# Patient Record
Sex: MALE | Race: WHITE | Employment: UNEMPLOYED | URBAN - METROPOLITAN AREA
[De-identification: names, ages, dates, MRNs, and addresses within clinical notes are randomized per-mention and may not be internally consistent; named-entity substitution may affect disease eponyms.]

---

## 2017-05-23 ENCOUNTER — EMPLOYEE WELLNESS (OUTPATIENT)
Dept: OTHER | Age: 57
End: 2017-05-23

## 2017-05-23 LAB
CHOLESTEROL, TOTAL: 163 MG/DL (ref 0–199)
GLUCOSE BLD-MCNC: 88 MG/DL (ref 70–99)
HDLC SERPL-MCNC: 53 MG/DL (ref 40–60)
LDL CHOLESTEROL CALCULATED: 86 MG/DL
TRIGL SERPL-MCNC: 119 MG/DL (ref 0–150)

## 2017-11-09 ENCOUNTER — HOSPITAL ENCOUNTER (OUTPATIENT)
Dept: ULTRASOUND IMAGING | Age: 57
Discharge: HOME | End: 2017-11-09
Attending: FAMILY MEDICINE | Admitting: FAMILY MEDICINE

## 2017-11-09 DIAGNOSIS — R74.02 NONSPECIFIC ELEVATION OF LEVELS OF TRANSAMINASE OR LACTIC ACID DEHYDROGENASE (LDH): ICD-10-CM

## 2017-11-09 DIAGNOSIS — R74.01 NONSPECIFIC ELEVATION OF LEVELS OF TRANSAMINASE OR LACTIC ACID DEHYDROGENASE (LDH): ICD-10-CM

## 2018-03-20 VITALS — WEIGHT: 283 LBS

## 2018-05-04 ENCOUNTER — EMPLOYEE WELLNESS (OUTPATIENT)
Dept: OTHER | Age: 58
End: 2018-05-04

## 2018-05-05 LAB
CHOLESTEROL, TOTAL: 184 MG/DL (ref 0–199)
GLUCOSE BLD-MCNC: 82 MG/DL (ref 70–99)
HDLC SERPL-MCNC: 49 MG/DL (ref 40–60)
LDL CHOLESTEROL CALCULATED: 122 MG/DL
TRIGL SERPL-MCNC: 65 MG/DL (ref 0–150)

## 2018-05-14 VITALS — WEIGHT: 283 LBS

## 2018-10-08 ENCOUNTER — HOSPITAL ENCOUNTER (OUTPATIENT)
Dept: ULTRASOUND IMAGING | Age: 58
Discharge: HOME | End: 2018-10-08
Payer: COMMERCIAL

## 2018-10-08 ENCOUNTER — APPOINTMENT (OUTPATIENT)
Dept: ULTRASOUND IMAGING | Age: 58
End: 2018-10-08
Payer: COMMERCIAL

## 2018-10-08 DIAGNOSIS — N17.9 ACUTE RENAL FAILURE, UNSPECIFIED ACUTE RENAL FAILURE TYPE (HCC): ICD-10-CM

## 2018-10-08 PROCEDURE — 76770 US EXAM ABDO BACK WALL COMP: CPT

## 2018-12-13 ENCOUNTER — HOSPITAL ENCOUNTER (OUTPATIENT)
Dept: GENERAL RADIOLOGY | Age: 58
Discharge: HOME | End: 2018-12-13
Payer: COMMERCIAL

## 2018-12-13 ENCOUNTER — HOSPITAL ENCOUNTER (OUTPATIENT)
Age: 58
Discharge: HOME | End: 2018-12-13
Payer: COMMERCIAL

## 2018-12-13 DIAGNOSIS — R06.2 WHEEZING: ICD-10-CM

## 2018-12-13 PROCEDURE — 71046 X-RAY EXAM CHEST 2 VIEWS: CPT

## 2019-04-04 ENCOUNTER — HOSPITAL ENCOUNTER (OUTPATIENT)
Dept: ULTRASOUND IMAGING | Age: 59
Discharge: HOME | End: 2019-04-04
Payer: COMMERCIAL

## 2019-04-04 ENCOUNTER — HOSPITAL ENCOUNTER (OUTPATIENT)
Age: 59
Discharge: HOME | End: 2019-04-04
Payer: COMMERCIAL

## 2019-04-04 DIAGNOSIS — N18.9 CHRONIC RENAL FAILURE, UNSPECIFIED CKD STAGE: ICD-10-CM

## 2019-04-04 LAB
ANION GAP SERPL CALCULATED.3IONS-SCNC: 10 MMOL/L (ref 3–16)
BASOPHILS ABSOLUTE: 0.1 K/UL (ref 0–0.2)
BASOPHILS RELATIVE PERCENT: 1.1 %
BUN BLDV-MCNC: 28 MG/DL (ref 7–20)
CALCIUM SERPL-MCNC: 9.5 MG/DL (ref 8.3–10.6)
CHLORIDE BLD-SCNC: 105 MMOL/L (ref 99–110)
CO2: 25 MMOL/L (ref 21–32)
CREAT SERPL-MCNC: 1.4 MG/DL (ref 0.9–1.3)
CREATININE URINE: 88.8 MG/DL (ref 39–259)
EOSINOPHILS ABSOLUTE: 0.4 K/UL (ref 0–0.6)
EOSINOPHILS RELATIVE PERCENT: 6.3 %
GFR AFRICAN AMERICAN: >60
GFR NON-AFRICAN AMERICAN: 52
GLUCOSE BLD-MCNC: 90 MG/DL (ref 70–99)
HCT VFR BLD CALC: 46.8 % (ref 40.5–52.5)
HEMOGLOBIN: 15.5 G/DL (ref 13.5–17.5)
LYMPHOCYTES ABSOLUTE: 1.3 K/UL (ref 1–5.1)
LYMPHOCYTES RELATIVE PERCENT: 21.5 %
MCH RBC QN AUTO: 29.5 PG (ref 26–34)
MCHC RBC AUTO-ENTMCNC: 33 G/DL (ref 31–36)
MCV RBC AUTO: 89.2 FL (ref 80–100)
MONOCYTES ABSOLUTE: 0.5 K/UL (ref 0–1.3)
MONOCYTES RELATIVE PERCENT: 7.9 %
NEUTROPHILS ABSOLUTE: 3.8 K/UL (ref 1.7–7.7)
NEUTROPHILS RELATIVE PERCENT: 63.2 %
PDW BLD-RTO: 14.7 % (ref 12.4–15.4)
PHOSPHORUS: 3 MG/DL (ref 2.5–4.9)
PLATELET # BLD: 175 K/UL (ref 135–450)
PMV BLD AUTO: 7.8 FL (ref 5–10.5)
POTASSIUM SERPL-SCNC: 4.5 MMOL/L (ref 3.5–5.1)
PROTEIN PROTEIN: 11 MG/DL
PROTEIN/CREAT RATIO: 0.1 MG/DL
RBC # BLD: 5.25 M/UL (ref 4.2–5.9)
SODIUM BLD-SCNC: 140 MMOL/L (ref 136–145)
WBC # BLD: 6 K/UL (ref 4–11)

## 2019-04-04 PROCEDURE — 36415 COLL VENOUS BLD VENIPUNCTURE: CPT

## 2019-04-04 PROCEDURE — 82570 ASSAY OF URINE CREATININE: CPT

## 2019-04-04 PROCEDURE — 84100 ASSAY OF PHOSPHORUS: CPT

## 2019-04-04 PROCEDURE — 84156 ASSAY OF PROTEIN URINE: CPT

## 2019-04-04 PROCEDURE — 80048 BASIC METABOLIC PNL TOTAL CA: CPT

## 2019-04-04 PROCEDURE — 85025 COMPLETE CBC W/AUTO DIFF WBC: CPT

## 2019-04-04 PROCEDURE — 76770 US EXAM ABDO BACK WALL COMP: CPT

## 2019-05-06 ENCOUNTER — EMPLOYEE WELLNESS (OUTPATIENT)
Dept: OTHER | Age: 59
End: 2019-05-06

## 2019-05-06 LAB
CHOLESTEROL, TOTAL: 188 MG/DL (ref 0–199)
GLUCOSE BLD-MCNC: 92 MG/DL (ref 70–99)
HDLC SERPL-MCNC: 43 MG/DL (ref 40–60)
LDL CHOLESTEROL CALCULATED: 127 MG/DL
TRIGL SERPL-MCNC: 89 MG/DL (ref 0–150)

## 2019-05-13 VITALS — WEIGHT: 284 LBS

## 2022-07-17 SDOH — HEALTH STABILITY: PHYSICAL HEALTH: ON AVERAGE, HOW MANY DAYS PER WEEK DO YOU ENGAGE IN MODERATE TO STRENUOUS EXERCISE (LIKE A BRISK WALK)?: 1 DAY

## 2022-07-17 SDOH — HEALTH STABILITY: PHYSICAL HEALTH: ON AVERAGE, HOW MANY MINUTES DO YOU ENGAGE IN EXERCISE AT THIS LEVEL?: 10 MIN

## 2022-07-17 ASSESSMENT — SOCIAL DETERMINANTS OF HEALTH (SDOH)
WITHIN THE LAST YEAR, HAVE YOU BEEN HUMILIATED OR EMOTIONALLY ABUSED IN OTHER WAYS BY YOUR PARTNER OR EX-PARTNER?: NO
WITHIN THE LAST YEAR, HAVE YOU BEEN AFRAID OF YOUR PARTNER OR EX-PARTNER?: NO
WITHIN THE LAST YEAR, HAVE TO BEEN RAPED OR FORCED TO HAVE ANY KIND OF SEXUAL ACTIVITY BY YOUR PARTNER OR EX-PARTNER?: NO
WITHIN THE LAST YEAR, HAVE YOU BEEN KICKED, HIT, SLAPPED, OR OTHERWISE PHYSICALLY HURT BY YOUR PARTNER OR EX-PARTNER?: NO

## 2022-07-20 ENCOUNTER — OFFICE VISIT (OUTPATIENT)
Dept: ORTHOPEDIC SURGERY | Age: 62
End: 2022-07-20
Payer: COMMERCIAL

## 2022-07-20 VITALS — HEIGHT: 70 IN | BODY MASS INDEX: 36.65 KG/M2 | WEIGHT: 256 LBS

## 2022-07-20 DIAGNOSIS — M51.36 DDD (DEGENERATIVE DISC DISEASE), LUMBAR: ICD-10-CM

## 2022-07-20 DIAGNOSIS — M79.604 BILATERAL LEG PAIN: ICD-10-CM

## 2022-07-20 DIAGNOSIS — M79.605 BILATERAL LEG PAIN: ICD-10-CM

## 2022-07-20 DIAGNOSIS — M54.50 LUMBAR SPINE PAIN: Primary | ICD-10-CM

## 2022-07-20 DIAGNOSIS — M48.062 SPINAL STENOSIS OF LUMBAR REGION WITH NEUROGENIC CLAUDICATION: ICD-10-CM

## 2022-07-20 PROCEDURE — 99203 OFFICE O/P NEW LOW 30 MIN: CPT | Performed by: FAMILY MEDICINE

## 2022-07-20 PROCEDURE — L0625 LO FLEX L1-BELOW L5 PRE OTS: HCPCS | Performed by: FAMILY MEDICINE

## 2022-07-20 RX ORDER — METHYLPREDNISOLONE 4 MG/1
TABLET ORAL
Qty: 21 KIT | Refills: 0 | Status: SHIPPED | OUTPATIENT
Start: 2022-07-20 | End: 2022-09-14

## 2022-07-20 RX ORDER — ROPINIROLE 0.5 MG/1
0.5 TABLET, FILM COATED ORAL 2 TIMES DAILY WITH MEALS
COMMUNITY

## 2022-07-20 RX ORDER — LISINOPRIL 5 MG/1
TABLET ORAL
COMMUNITY
Start: 2022-06-11

## 2022-07-20 RX ORDER — CELECOXIB 200 MG/1
CAPSULE ORAL
COMMUNITY
Start: 2022-07-06

## 2022-07-20 RX ORDER — ALLOPURINOL 300 MG/1
TABLET ORAL
COMMUNITY
Start: 2007-11-09

## 2022-07-20 RX ORDER — TOPIRAMATE 25 MG/1
25 TABLET ORAL 2 TIMES DAILY
COMMUNITY
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
## 2022-07-22 ENCOUNTER — TELEPHONE (OUTPATIENT)
Dept: ORTHOPEDIC SURGERY | Age: 62
End: 2022-07-22

## 2022-07-22 NOTE — TELEPHONE ENCOUNTER
Left voicemail for patient that their MRI has been authorized and that they can call and schedule scan at their convenience. Also told them that they can call and schedule a f/u with Hussein Dennison once they have MRI scheduled, leaving at least 2-3 days for our office to receive their results.

## 2022-07-29 ENCOUNTER — EVALUATION (OUTPATIENT)
Dept: PHYSICAL THERAPY | Age: 62
End: 2022-07-29
Payer: COMMERCIAL

## 2022-07-29 DIAGNOSIS — G89.29 CHRONIC LOW BACK PAIN WITH BILATERAL SCIATICA, UNSPECIFIED BACK PAIN LATERALITY: Primary | ICD-10-CM

## 2022-07-29 DIAGNOSIS — M54.41 CHRONIC LOW BACK PAIN WITH BILATERAL SCIATICA, UNSPECIFIED BACK PAIN LATERALITY: Primary | ICD-10-CM

## 2022-07-29 DIAGNOSIS — M54.42 CHRONIC LOW BACK PAIN WITH BILATERAL SCIATICA, UNSPECIFIED BACK PAIN LATERALITY: Primary | ICD-10-CM

## 2022-07-29 DIAGNOSIS — M79.604 BILATERAL LEG PAIN: ICD-10-CM

## 2022-07-29 DIAGNOSIS — M48.062 SPINAL STENOSIS OF LUMBAR REGION WITH NEUROGENIC CLAUDICATION: ICD-10-CM

## 2022-07-29 DIAGNOSIS — M79.605 BILATERAL LEG PAIN: ICD-10-CM

## 2022-07-29 DIAGNOSIS — M51.36 DDD (DEGENERATIVE DISC DISEASE), LUMBAR: ICD-10-CM

## 2022-07-29 PROCEDURE — 97112 NEUROMUSCULAR REEDUCATION: CPT | Performed by: PHYSICAL THERAPIST

## 2022-07-29 PROCEDURE — 97110 THERAPEUTIC EXERCISES: CPT | Performed by: PHYSICAL THERAPIST

## 2022-07-29 PROCEDURE — 97162 PT EVAL MOD COMPLEX 30 MIN: CPT | Performed by: PHYSICAL THERAPIST

## 2022-07-29 NOTE — PROGRESS NOTES
[facility]   Phone: [phone]    Fax: [phone]          Physical Therapy Treatment Note/ Progress Report:           Date:  2022    Patient Name:  Misti Jackson    :  1960  MRN: [id number]  Restrictions/Precautions:    Medical/Treatment Diagnosis Information:  Diagnosis: Lumbar stenosis w/ neurogenic claudication, Lumbar DDD, B leg pain     Insurance/Certification information:  PT Insurance Information: BCBS  Physician Information:  Referring Practitioner: Dr. Gilmar Schultz  Has the plan of care been signed (Y/N):        []  Yes  [x]  No     Date of Patient follow up with Physician: 8/15/2022 Sujatha Santiago PA-C      Is this a Progress Report:     [x]  Yes  []  No        If Yes:  Date Range for reporting period:  Beginnin2022  Endin2022    Progress report will be due (10 Rx or 30 days whichever is less):        Recertification will be due (POC Duration  / 90 days whichever is less): 2022         Visit # Insurance Allowable Auth Required   1 30 []  Yes [x]  No        Functional Scale: FOTO score: 38    Date assessed:  2022      Latex Allergy:  [x]NO      []YES  Preferred Language for Healthcare:   [x]English       []other:      Pain level:  7/10         SUBJECTIVE:  See eval        OBJECTIVE: See eval        ROM   Comments:  2022   Lumbar Flex 65%     Lumbar Ext neutral        ROM LEFT RIGHT Comments:  2022   Lumbar Side Bend 65% 65%     Lumbar Rotation 40% 40%     Hip Flexion         Hip Abd         Hip ER         Hip IR         Hip Extension         Knee Ext WNL WNL     Knee Flex WNL WNL     Hamstring Flex         Piriformis                                Strength LEFT RIGHT Comments:  2022   Multifidus         Transverse Ab         Hip Flexors 4/5* 4/5*     Hip Abductors 4-/5* 4-/5* In sitting   Hip Adductors 3+/5* 3+/5* In sitting   Knee extension 4-/5* 4-/5* In sitting   Knee flexion 3+/5* 3+/5* In sitting DF 4+/5* 4+/5        Myotomes Normal Abnormal Comments:  7/29/2022   [x]ALL NORMAL                   Hip flexion (L1-L2) []  []      Knee extension (L2-L4) []  []      Dorsiflexion (L4-L5) []  []      Great Toe Ext (L5) []  []      Ankle Eversion (S1-S2) []  []      Ankle PF(S1-S2) []  []         Dermatomes Normal Abnormal Comments:  7/29/2022   []ALL NORMAL                   inguinal area (L1) [x]  []      anterior mid-thigh (L2) []  [x]  Disrupted on R from fibromyalgia, not new   distal ant thigh/med knee (L3) [x]  []      medial lower leg and foot (L4) [x]  []      lateral lower leg and foot (L5) [x]  []      posterior calf (S1) [x]  []      medial calcaneus (S2) [x]  []            Reflexes Normal Abnormal Comments:  7/29/2022   [x]ALL NORMAL                   S1-2 Seated achilles []  []      S1-2 Prone knee bend []  []      L3-4 Patellar tendon []  []      C5-6 Biceps []  []      C6 Brachioradialis []  []      C7-8 Triceps []  []      Clonus []  []      Babinski []  []      Sharif's []  []         Neural dynamic tension testing Normal Abnormal Comments:  7/29/2022   Slump Test  - Degree of knee flexion:  []  [x]   Positive bilaterally with minimal flexion of spine or LE extension   SLR []  []      0-30 []  []      30-70 []  []      Femoral nerve (L2-4) []  []           Normal Abnormal N/A Comments:  7/29/2022   Fwd Bend-aberrant or innominate mvmt) []  []  []      Trendelenburg []  []  []      Kemps/Alexander []  []  []      Edmond Pratt []  []  []      SAQIB/Henry []  []  []      Hip scour []  []  []      Supine to sit []  []  []      Prone knee bend []  []  []                  Hip thrust []  []  []      SI distraction/compression []  []  []      Sacral Spring/thrust []  []  []                          RESTRICTIONS/PRECAUTIONS: chronic tachycardia, postconcussive syndrome, fibromyalgia        Exercises/Interventions:     Exercise/Equipment Resistance/Repetitions Other comments        Hamstring stretch 5x20\" EOT; bilaterally   Calf stretch 5x20\" bilaterally   Piriformis stretch     Knee to chest     Seated on heels low back stretch     Pelvic tilts     TrA contraction 10x10\"    TrA contraction with alt marches     Prone press ups     BS 10x10\"    B hip abd with TB          Table walk back 10x10\"         Low lumbar rotation     bridges     Cat/camel      bird/dog     Opposite UE to LE isometric core     TrA/mutlifidi walk outs     TrA/multifidi push outs                         SB pikes     SB knee tucks     SB roll outs     planks                         Bike     Treadmill             Therapeutic Exercise and NMR EXR  [x] (71449) Provided verbal/tactile cueing for activities related to strengthening, flexibility, endurance, ROM  for improvements in proximal hip and core control with self care, mobility, lifting and ambulation. [x] (55896) Provided verbal/tactile cueing for activities related to improving balance, coordination, kinesthetic sense, posture, motor skill, proprioception  to assist with core control in self care, mobility, lifting, and ambulation. Therapeutic Activities:    [] (88240 or 28741) Provided verbal/tactile cueing for activities related to improving balance, coordination, kinesthetic sense, posture, motor skill, proprioception and motor activation to allow for proper function  with self care and ADLs  [] (07883) Provided training and instruction to the patient for proper core and proximal hip recruitment and positioning with ambulation re-education     Home Exercise Program:    [x] (15258) Reviewed/Progressed HEP activities related to strengthening, flexibility, endurance, ROM of core, proximal hip and LE for functional self-care, mobility, lifting and ambulation     Access Code: [email or web address]  URL: [email or web address]  Date: 07/29/2022  Prepared by: Bassem Majano    Exercises  Long Sitting Calf Stretch with Strap - 1-2 x daily - 5-7 x weekly - 1 sets - 5 reps - 20 seconds hold  Seated Table Hamstring Stretch - 1-2 x daily - 5-7 x weekly - 1 sets - 5 reps - 20 seconds hold  Long Sitting Hip Adduction Isometric with Ball - 1-2 x daily - 5-7 x weekly - 1 sets - 10 reps - 10 seconds hold  Supine Transversus Abdominis Bracing - Hands on Stomach - 1-2 x daily - 5-7 x weekly - 1 sets - 10 reps - 10 seconds hold  Standing Shoulder and Trunk Flexion at Table - 1-2 x daily - 5-7 x weekly - 2 sets - 10 reps - 10 seconds hold      [] (34303) Reviewed/Progressed HEP activities related to improving balance, coordination, kinesthetic sense, posture, motor skill, proprioception of core, proximal hip and LE for self care, mobility, lifting, and ambulation      Manual Treatments:  PROM / STM / Oscillations-Mobs:  G-I, II, III, IV (PA's, Inf., Post.)  [] (38086) Provided manual therapy to mobilize proximal hip and LS spine soft tissue/joints for the purpose of modulating pain, promoting relaxation,  increasing ROM, reducing/eliminating soft tissue swelling/inflammation/restriction, improving soft tissue extensibility and allowing for proper ROM for normal function with self care, mobility, lifting and ambulation. Modalities:       Charges:  Timed Code Treatment Minutes: 30   Total Treatment Minutes: 56     [] EVAL (LOW) 21360 (typically 20 minutes face-to-face)  [x] EVAL (MOD) 52340 (typically 30 minutes face-to-face) (20')  [] EVAL (HIGH) 27534 (typically 45 minutes face-to-face)  [] RE-EVAL   [x] AB(84476) x 1 (18') [] IONTO  [x] NMR (37209) x  1 (12')   [] VASO  [] Manual (51856) x      [] Other:  [] TA (21939) x      [] Mech Traction (33983)  [] ES(attended) (32741)      [] ES (un) (71584):     ASSESSMENT:  See eval        GOALS:    Patient stated goal: decrease pain and improve function  [] Progressing: [] Met: [] Not Met: [] Adjusted  Therapist goals for Patient:   Short Term Goals: To be achieved in: 2 weeks  1. Independent in HEP and progression per patient tolerance, in order to prevent re-injury.    [] Progressing: [] Met: [] Not Met: [] Adjusted  2. Patient will have a decrease in pain to facilitate improvement in movement, function, and ADLs as indicated by Functional Deficits. [] Progressing: [] Met: [] Not Met: [] Adjusted    Long Term Goals: To be achieved in: 6 weeks  1. Functional index score of 55 or more for FOTO to assist with reaching prior level of function. [] Progressing: [] Met: [] Not Met: [] Adjusted  2. Patient will demonstrate increased AROM to WNL, good LS mobility, good hip ROM to allow for proper joint functioning as indicated by patients Functional Deficits. [] Progressing: [] Met: [] Not Met: [] Adjusted  3. Patient will demonstrate an increase in Strength to good proximal hip and core activation to allow for proper functional mobility as indicated by patients Functional Deficits. [] Progressing: [] Met: [] Not Met: [] Adjusted  4. Patient will return to all bathing and dressing ADLs independently with 2/10 pain or less in lumbar spine so that he may return to PLOF for ADLs. [] Progressing: [] Met: [] Not Met: [] Adjusted  5. Patient will be able to walk/stand >3 hours with 2/10 pain or less in low back so that he may return to PLOF for all ADLs. [] Progressing: [] Met: [] Not Met: [] Adjusted      Overall Progression Towards Functional goals/ Treatment Progress Update:  [] Patient is progressing as expected towards functional goals listed. [] Progression is slowed due to complexities/Impairments listed. [] Progression has been slowed due to co-morbidities.   [x] Plan just implemented, too soon to assess goals progression <30days   [] Goals require adjustment due to lack of progress  [] Patient is not progressing as expected and requires additional follow up with physician  [] Other    Prognosis for POC: [x] Good [] Fair  [] Poor      Patient requires continued skilled intervention: [x] Yes  [] No    Treatment/Activity Tolerance:  [x] Patient able to complete treatment  []

## 2022-07-29 NOTE — PROGRESS NOTES
[facility]   Phone: [phone]    Fax: [phone]      Physical Therapy Certification    Dear Referring Practitioner: Dr. Brie Gunter,    We had the pleasure of evaluating the following patient for physical therapy services at [address] [place] [address]. A summary of our findings can be found in the initial assessment below. This includes our plan of care. If you have any questions or concerns regarding these findings, please do not hesitate to contact me at the office phone number checked above. Thank you for the referral.       Physician Signature:_______________________________Date:__________________  By signing above (or electronic signature), therapists plan is approved by physician      Patient: Marta Barth   : 1960   MRN: [id number]  Referring Physician: Referring Practitioner: Dr. Brie Gunter      Evaluation Date: 2022      Medical Diagnosis Information:  Diagnosis: Lumbar stenosis w/ neurogenic claudication, Lumbar DDD, B leg pain                                             Insurance information: PT Insurance Information: BCBS     Precautions/ Contra-indications: chronic tachycardia, postconcussive syndrome, fibromyalgia  Latex Allergy:  [x]NO      []YES    C-SSRS Triggered by Intake questionnaire (Past 2 wk assessment):   [x] No, Questionnaire did not trigger screening.   [] Yes, Patient intake triggered further evaluation      [] C-SSRS Screening completed  [] PCP notified via Plan of Care  [] Emergency services notified     Preferred Language for Healthcare:   [x]English       []other:      SUBJECTIVE: Patient stated complaint: Patient reports that long standing history of back pain and issues. He notes that in 2022 he began to have a flare up of back pain that began giving him weakness and symptoms in B LEs. He reports symptoms in B legs are sensation of swelling and tightness in B feet/toes, cramping in the calves.  He notes that L Comments:  7/29/2022   []ALL NORMAL            inguinal area (L1)  [x] []    anterior mid-thigh (L2) [] [x] Disrupted on R from fibromyalgia, not new   distal ant thigh/med knee (L3) [x] []    medial lower leg and foot (L4) [x] []    lateral lower leg and foot (L5) [x] []    posterior calf (S1) [x] []    medial calcaneus (S2) [x] []        Reflexes Normal Abnormal Comments:  7/29/2022   [x]ALL NORMAL            S1-2 Seated achilles [] []    S1-2 Prone knee bend [] []    L3-4 Patellar tendon [] []    C5-6 Biceps [] []    C6 Brachioradialis [] []    C7-8 Triceps [] []    Clonus [] []    Babinski [] []    Sharif's [] []      Neural dynamic tension testing Normal Abnormal Comments:  7/29/2022   Slump Test  - Degree of knee flexion:  [] [x]  Positive bilaterally with minimal flexion of spine or LE extension   SLR  [] []    0-30 [] []    30-70 [] []    Femoral nerve (L2-4) [] []       Normal Abnormal N/A Comments:  7/29/2022   Fwd Bend-aberrant or innominate mvmt) [] [] []    Trendelenburg [] [] []    Kemps/Quadrant [] [] []    Stork [] [] []    SAQIB/Henry [] [] []    Hip scour [] [] []    Supine to sit [] [] []    Prone knee bend [] [] []           Hip thrust [] [] []    SI distraction/compression [] [] []    Sacral Spring/thrust [] [] []                 Joint mobility:    []Normal    [x]Hypo   []Hyper    Palpation: +2 TTP globally in lower thoracic and lumbar spine B; reports significant TTP throughout entire back secondary to fibromyalgia    Functional Mobility/Transfers: Patient is limited to walking and standing tolerance combined for 2 hours and then has to take seated rest break. Posture: forward flexed posture throughout spine    Gait: (include devices/WB status) Antalgic, stiff gait, slow lilia, decreased stride length bilaterally    Bandages/Dressings/Incisions: NA    Orthopedic Special Tests: see above     [x] Patient history, allergies, meds reviewed. Medical chart reviewed. See intake form. Review Of Systems (ROS):  [x]Performed Review of systems (Integumentary, CardioPulmonary, Neurological) by intake and observation. Intake form has been scanned into medical record. Patient has been instructed to contact their primary care physician regarding ROS issues if not already being addressed at this time. Co-morbidities/Complexities (which will affect course of rehabilitation):   []None           Arthritic conditions   []Rheumatoid arthritis (M05.9)  [x]Osteoarthritis (M19.91)   Cardiovascular conditions   [x]Hypertension (I10)  []Hyperlipidemia (E78.5)  []Angina pectoris (I20)  []Atherosclerosis (I70)  []CVA Musculoskeletal conditions   []Disc pathology   []Congenital spine pathologies   []Prior surgical intervention  []Osteoporosis (M81.8)  []Osteopenia (M85.8)   Endocrine conditions   []Hypothyroid (E03.9)  []Hyperthyroid Gastrointestinal conditions   []Constipation (M67.76)   Metabolic conditions   []Morbid obesity (E66.01)  []Diabetes type 1(E10.65) or 2 (E11.65)   []Neuropathy (G60.9)     Pulmonary conditions   []Asthma (J45)  []Coughing   []COPD (J44.9)   Psychological Disorders  []Anxiety (F41.9)  []Depression (F32.9)   []Other:   [x]Other:   chronic tachycardia, postconcussive syndrome, fibromyalgia        Barriers to/and or personal factors that will affect rehab potential:              []Age  []Sex    []Smoker              [x]Motivation/Lack of Motivation: patient demonstrates moderate motivation                        [x]Co-Morbidities: post-concussion syndrome              []Cognitive Function, education/learning barriers              []Environmental, home barriers              []profession/work barriers  [x]past PT/medical experience: Patient has significant past medical history and lengthy complications;  Patient demonstrates fair understanding of POC  []other:  Justification: see above          ASSESSMENT:   Functional Impairments:     []Noted lumbar/proximal hip hypomobility   []Noted lumbosacral and/or generalized hypermobility   [x]Decreased Lumbosacral/hip/LE functional ROM   []Decreased core/proximal hip strength and neuromuscular control    [x]Decreased LE functional strength    []Abnormal reflexes/sensation/myotomal/dermatomal deficits  [x]Reduced balance/proprioceptive control    []other:      Functional Activity Limitations (from functional questionnaire and intake)   [x]Reduced ability to tolerate prolonged functional positions   [x]Reduced ability or difficulty with changes of positions or transfers between positions   [x]Reduced ability to maintain good posture and demonstrate good body mechanics with sitting, bending, and lifting   [x]Reduced ability to sleep   [x] Reduced ability or tolerance with driving and/or computer work   [x]Reduced ability to perform lifting, reaching, carrying tasks   [x]Reduced ability to squat   [x]Reduced ability to forward bend   [x]Reduced ability to ambulate prolonged functional periods/distances/surfaces   [x]Reduced ability to ascend/descend stairs   []other:       Participation Restrictions   [x]Reduced participation in self care activities   [x]Reduced participation in home management activities   []Reduced participation in work activities   [x]Reduced participation in social activities. []Reduced participation in sport/recreational activities. Classification:    []Signs/symptoms consistent with Lumbar instability/stabilization subgroup. []Signs/symptoms consistent with Lumbar mobilization/manipulation subgroup, myotomes and dermatomes intact. Meets manipulation criteria.     []Signs/symptoms consistent with Lumbar direction specific/centralization subgroup   []Signs/symptoms consistent with Lumbar traction subgroup       []Signs/symptoms consistent with lumbar facet dysfunction   [x]Signs/symptoms consistent with lumbar stenosis type dysfunction   []Signs/symptoms consistent with nerve root involvement including myotome & dermatome dysfunction   []Signs/symptoms consistent with post-surgical status including: decreased ROM, strength and function. []signs/symptoms consistent with pathology which may benefit from Dry needling     []other:      Prognosis/Rehab Potential:      []Excellent   []Good    [x]Fair   []Poor    Tolerance of evaluation/treatment:    []Excellent   []Good    [x]Fair   []Poor     Physical Therapy Evaluation Complexity Justification  [x] A history of present problem with:  [] no personal factors and/or comorbidities that impact the plan of care;  [x]1-2 personal factors and/or comorbidities that impact the plan of care  []3 personal factors and/or comorbidities that impact the plan of care  [x] An examination of body systems using standardized tests and measures addressing any of the following: body structures and functions (impairments), activity limitations, and/or participation restrictions;:  [] a total of 1-2 or more elements   [x] a total of 3 or more elements   [] a total of 4 or more elements   [x] A clinical presentation with:  [] stable and/or uncomplicated characteristics   [x] evolving clinical presentation with changing characteristics  [] unstable and unpredictable characteristics;   [x] Clinical decision making of [] low, [x] moderate, [] high complexity using standardized patient assessment instrument and/or measurable assessment of functional outcome.     [x] EVAL (LOW) 58147 (typically 20 minutes face-to-face)  [] EVAL (MOD) 43844 (typically 30 minutes face-to-face)  [] EVAL (HIGH) 63949 (typically 45 minutes face-to-face)  [] RE-EVAL     PLAN: Begin PT focusing on: proximal hip mobilizations, LB mobs, LB core activation, proximal hip activation, and HEP    Frequency/Duration:  1-2 days per week for 3-4 Weeks:  Interventions:  [x]  Therapeutic exercise including: strength training, ROM, for LE, Glutes and core   [x]  NMR activation and proprioception for glutes , LE and Core   [x]  Manual therapy as indicated for Hip complex, LE and spine to include: Dry Needling/IASTM, STM, PROM, Gr I-IV mobilizations, manipulation. [x]  Modalities as needed that may include: thermal agents, E-stim, Biofeedback, US, iontophoresis as indicated  [x]  Patient education on joint protection, postural re-education, activity modification, progression of HEP. HEP instruction: Patient returned proper demonstration of HEP. Issued patient written program clearly stating sets/reps/sessions per day. Written program was scanned into media section of medical record. (see scanned forms)    GOALS:  Patient stated goal: decrease pain and improve function  [] Progressing: [] Met: [] Not Met: [] Adjusted  Therapist goals for Patient:   Short Term Goals: To be achieved in: 2 weeks  1. Independent in HEP and progression per patient tolerance, in order to prevent re-injury. [] Progressing: [] Met: [] Not Met: [] Adjusted  2. Patient will have a decrease in pain to facilitate improvement in movement, function, and ADLs as indicated by Functional Deficits. [] Progressing: [] Met: [] Not Met: [] Adjusted    Long Term Goals: To be achieved in: 6 weeks  1. Functional index score of 55 or more for FOTO to assist with reaching prior level of function. [] Progressing: [] Met: [] Not Met: [] Adjusted  2. Patient will demonstrate increased AROM to WNL, good LS mobility, good hip ROM to allow for proper joint functioning as indicated by patients Functional Deficits. [] Progressing: [] Met: [] Not Met: [] Adjusted  3. Patient will demonstrate an increase in Strength to good proximal hip and core activation to allow for proper functional mobility as indicated by patients Functional Deficits. [] Progressing: [] Met: [] Not Met: [] Adjusted  4. Patient will return to all bathing and dressing ADLs independently with 2/10 pain or less in lumbar spine so that he may return to PLOF for ADLs.   [] Progressing: [] Met: [] Not Met: [] Adjusted  5. Patient will be able to walk/stand >3 hours with 2/10 pain or less in low back so that he may return to PLOF for all ADLs.   [] Progressing: [] Met: [] Not Met: [] Adjusted     Electronically signed by:  Seb Aquino, PT, DPT, OCS, OMT-C    Board-Certified Orthopaedic Clinical Specialist    [id number] [address] PT license: [id number]  [place] PT license: [id number]

## 2022-08-01 ENCOUNTER — TREATMENT (OUTPATIENT)
Dept: PHYSICAL THERAPY | Age: 62
End: 2022-08-01
Payer: COMMERCIAL

## 2022-08-01 DIAGNOSIS — G89.29 CHRONIC LOW BACK PAIN WITH BILATERAL SCIATICA, UNSPECIFIED BACK PAIN LATERALITY: Primary | ICD-10-CM

## 2022-08-01 DIAGNOSIS — M79.604 BILATERAL LEG PAIN: ICD-10-CM

## 2022-08-01 DIAGNOSIS — M54.42 CHRONIC LOW BACK PAIN WITH BILATERAL SCIATICA, UNSPECIFIED BACK PAIN LATERALITY: Primary | ICD-10-CM

## 2022-08-01 DIAGNOSIS — M51.36 DDD (DEGENERATIVE DISC DISEASE), LUMBAR: ICD-10-CM

## 2022-08-01 DIAGNOSIS — M48.062 SPINAL STENOSIS OF LUMBAR REGION WITH NEUROGENIC CLAUDICATION: ICD-10-CM

## 2022-08-01 DIAGNOSIS — M79.605 BILATERAL LEG PAIN: ICD-10-CM

## 2022-08-01 DIAGNOSIS — M54.41 CHRONIC LOW BACK PAIN WITH BILATERAL SCIATICA, UNSPECIFIED BACK PAIN LATERALITY: Primary | ICD-10-CM

## 2022-08-01 PROCEDURE — 97112 NEUROMUSCULAR REEDUCATION: CPT | Performed by: PHYSICAL THERAPIST

## 2022-08-01 PROCEDURE — 97110 THERAPEUTIC EXERCISES: CPT | Performed by: PHYSICAL THERAPIST

## 2022-08-01 NOTE — PROGRESS NOTES
[facility]   Phone: [phone]    Fax: [phone]          Physical Therapy Treatment Note/ Progress Report:           Date:  2022    Patient Name:  Misti Jackson    :  1960  MRN: [id number]  Restrictions/Precautions:    Medical/Treatment Diagnosis Information:  Diagnosis: Lumbar stenosis w/ neurogenic claudication, Lumbar DDD, B leg pain     Insurance/Certification information:  PT Insurance Information: BCBS  Physician Information:  Referring Practitioner: Dr. Gilmar Schultz  Has the plan of care been signed (Y/N):        [x]  Yes  []  No     Date of Patient follow up with Physician: 8/15/2022 Sujatha Santiago PA-C)      Is this a Progress Report:     []  Yes  [x]  No        If Yes:  Date Range for reporting period:  Beginnin2022  Endin2022    Progress report will be due (10 Rx or 30 days whichever is less): 3/37/3534       Recertification will be due (POC Duration  / 90 days whichever is less): 2022         Visit # Insurance Allowable Auth Required   2 30 []  Yes [x]  No        Functional Scale: FOTO score: 38    Date assessed:  2022      Latex Allergy:  [x]NO      []YES  Preferred Language for Healthcare:   [x]English       []other:      Pain level:  7/10         SUBJECTIVE:  Patient reports that pain in his back and LEs remain the same today. He reports that he had some weakness and tingling in B LEs while mowing Saturday and he had to rest frequently.         OBJECTIVE: See eval        ROM   Comments:  2022   Lumbar Flex 65%     Lumbar Ext neutral        ROM LEFT RIGHT Comments:  2022   Lumbar Side Bend 65% 65%     Lumbar Rotation 40% 40%     Hip Flexion         Hip Abd         Hip ER         Hip IR         Hip Extension         Knee Ext WNL WNL     Knee Flex WNL WNL     Hamstring Flex         Piriformis                                Strength LEFT RIGHT Comments:  2022   Multifidus         Transverse Ab         Hip Flexors 4/5* 4/5*     Hip Abductors 4-/5* 4-/5* In sitting   Hip Adductors 3+/5* 3+/5* In sitting   Knee extension 4-/5* 4-/5* In sitting   Knee flexion 3+/5* 3+/5* In sitting   DF 4+/5* 4+/5        Myotomes Normal Abnormal Comments:  7/29/2022   [x]ALL NORMAL                   Hip flexion (L1-L2) []  []      Knee extension (L2-L4) []  []      Dorsiflexion (L4-L5) []  []      Great Toe Ext (L5) []  []      Ankle Eversion (S1-S2) []  []      Ankle PF(S1-S2) []  []         Dermatomes Normal Abnormal Comments:  7/29/2022   []ALL NORMAL                   inguinal area (L1) [x]  []      anterior mid-thigh (L2) []  [x]  Disrupted on R from fibromyalgia, not new   distal ant thigh/med knee (L3) [x]  []      medial lower leg and foot (L4) [x]  []      lateral lower leg and foot (L5) [x]  []      posterior calf (S1) [x]  []      medial calcaneus (S2) [x]  []            Reflexes Normal Abnormal Comments:  7/29/2022   [x]ALL NORMAL                   S1-2 Seated achilles []  []      S1-2 Prone knee bend []  []      L3-4 Patellar tendon []  []      C5-6 Biceps []  []      C6 Brachioradialis []  []      C7-8 Triceps []  []      Clonus []  []      Babinski []  []      Sharif's []  []         Neural dynamic tension testing Normal Abnormal Comments:  7/29/2022   Slump Test  - Degree of knee flexion:  []  [x]   Positive bilaterally with minimal flexion of spine or LE extension   SLR []  []      0-30 []  []      30-70 []  []      Femoral nerve (L2-4) []  []           Normal Abnormal N/A Comments:  7/29/2022   Fwd Bend-aberrant or innominate mvmt) []  []  []      Trendelenburg []  []  []      Kemps/Quadrant []  []  []      Ac Charli []  []  []      SAQIB/Henry []  []  []      Hip scour []  []  []      Supine to sit []  []  []      Prone knee bend []  []  []                  Hip thrust []  []  []      SI distraction/compression []  []  []      Sacral Spring/thrust []  []  []                          RESTRICTIONS/PRECAUTIONS: chronic tachycardia, postconcussive syndrome, fibromyalgia        Exercises/Interventions:     Exercise/Equipment Resistance/Repetitions Other comments        Hamstring stretch 5x20\" EOT; bilaterally   Calf stretch 5x20\" bilaterally   Piriformis stretch     Knee to chest     Seated on heels low back stretch     Pelvic tilts     TrA contraction 10x10\"    TrA contraction with alt marches 2x10    Prone press ups     BS 10x10\"    B hip abd with TB Black, 10x10\"         Table walk back 10x10\"         Low lumbar rotation     bridges     Cat/camel      bird/dog     Opposite UE to LE isometric core     TrA/mutlifidi walk outs     TrA/multifidi push outs                         SB pikes     SB knee tucks     SB roll outs     planks                         Bike     Treadmill             Therapeutic Exercise and NMR EXR  [x] (78810) Provided verbal/tactile cueing for activities related to strengthening, flexibility, endurance, ROM  for improvements in proximal hip and core control with self care, mobility, lifting and ambulation. [x] (92769) Provided verbal/tactile cueing for activities related to improving balance, coordination, kinesthetic sense, posture, motor skill, proprioception  to assist with core control in self care, mobility, lifting, and ambulation.      Therapeutic Activities:    [] (24703 or 54720) Provided verbal/tactile cueing for activities related to improving balance, coordination, kinesthetic sense, posture, motor skill, proprioception and motor activation to allow for proper function  with self care and ADLs  [] (42414) Provided training and instruction to the patient for proper core and proximal hip recruitment and positioning with ambulation re-education     Home Exercise Program:    [x] (14158) Reviewed/Progressed HEP activities related to strengthening, flexibility, endurance, ROM of core, proximal hip and LE for functional self-care, mobility, lifting and ambulation     Access Code: [email or web address]  URL: [email or web address] com/  Date: 07/29/2022  Prepared by: Bassem Majano    Exercises  Long Sitting Calf Stretch with Strap - 1-2 x daily - 5-7 x weekly - 1 sets - 5 reps - 20 seconds hold  Seated Table Hamstring Stretch - 1-2 x daily - 5-7 x weekly - 1 sets - 5 reps - 20 seconds hold  Long Sitting Hip Adduction Isometric with Ball - 1-2 x daily - 5-7 x weekly - 1 sets - 10 reps - 10 seconds hold  Supine Transversus Abdominis Bracing - Hands on Stomach - 1-2 x daily - 5-7 x weekly - 1 sets - 10 reps - 10 seconds hold  Standing Shoulder and Trunk Flexion at Table - 1-2 x daily - 5-7 x weekly - 2 sets - 10 reps - 10 seconds hold      [] (88889) Reviewed/Progressed HEP activities related to improving balance, coordination, kinesthetic sense, posture, motor skill, proprioception of core, proximal hip and LE for self care, mobility, lifting, and ambulation      Manual Treatments:  PROM / STM / Oscillations-Mobs:  G-I, II, III, IV (PA's, Inf., Post.)  [] (25493) Provided manual therapy to mobilize proximal hip and LS spine soft tissue/joints for the purpose of modulating pain, promoting relaxation,  increasing ROM, reducing/eliminating soft tissue swelling/inflammation/restriction, improving soft tissue extensibility and allowing for proper ROM for normal function with self care, mobility, lifting and ambulation. Modalities: Patient declined      Charges:  Timed Code Treatment Minutes: 30   Total Treatment Minutes: 30     [] EVAL (LOW) 77969 (typically 20 minutes face-to-face)  [] EVAL (MOD) 98534 (typically 30 minutes face-to-face)   [] EVAL (HIGH) 27548 (typically 45 minutes face-to-face)  [] RE-EVAL   [x] RAD(58318) x 1 (18') [] IONTO  [x] NMR (23617) x  1 (12')   [] VASO  [] Manual (20365) x      [] Other:  [] TA (56025) x      [] Mech Traction (24123)  [] ES(attended) (73185)      [] ES (un) (18135):         ASSESSMENT:  Patient demonstrates good carry over with exercises from initial visit.  He requires moderate cuing from PT to continue breathing during TA isometrics today. He is challenged with TA iso with marching to maintain isometric abdominal contraction. R LE is notably weaker with TB hip abduction isometrics, and PT reviewed how to isolate individual legs to get improving strength in B LEs. Patient will be getting his MRI tomorrow and a prostate surgery on 8/8/2022. He was instructed that he will need a note from that surgeon when he is permitted to resume skilled PT services. Patient verbalizes understanding of this plan. GOALS:    Patient stated goal: decrease pain and improve function  [] Progressing: [] Met: [] Not Met: [] Adjusted  Therapist goals for Patient:   Short Term Goals: To be achieved in: 2 weeks  1. Independent in HEP and progression per patient tolerance, in order to prevent re-injury. [] Progressing: [] Met: [] Not Met: [] Adjusted  2. Patient will have a decrease in pain to facilitate improvement in movement, function, and ADLs as indicated by Functional Deficits. [] Progressing: [] Met: [] Not Met: [] Adjusted    Long Term Goals: To be achieved in: 6 weeks  1. Functional index score of 55 or more for FOTO to assist with reaching prior level of function. [] Progressing: [] Met: [] Not Met: [] Adjusted  2. Patient will demonstrate increased AROM to WNL, good LS mobility, good hip ROM to allow for proper joint functioning as indicated by patients Functional Deficits. [] Progressing: [] Met: [] Not Met: [] Adjusted  3. Patient will demonstrate an increase in Strength to good proximal hip and core activation to allow for proper functional mobility as indicated by patients Functional Deficits. [] Progressing: [] Met: [] Not Met: [] Adjusted  4. Patient will return to all bathing and dressing ADLs independently with 2/10 pain or less in lumbar spine so that he may return to PLOF for ADLs. [] Progressing: [] Met: [] Not Met: [] Adjusted  5.  Patient will be able to walk/stand >3 hours with 2/10 pain or less in low back so that he may return to PLOF for all ADLs. [] Progressing: [] Met: [] Not Met: [] Adjusted      Overall Progression Towards Functional goals/ Treatment Progress Update:  [] Patient is progressing as expected towards functional goals listed. [] Progression is slowed due to complexities/Impairments listed. [] Progression has been slowed due to co-morbidities. [x] Plan just implemented, too soon to assess goals progression <30days   [] Goals require adjustment due to lack of progress  [] Patient is not progressing as expected and requires additional follow up with physician  [] Other    Prognosis for POC: [x] Good [] Fair  [] Poor      Patient requires continued skilled intervention: [x] Yes  [] No    Treatment/Activity Tolerance:  [x] Patient able to complete treatment  [] Patient limited by fatigue  [] Patient limited by pain    [] Patient limited by other medical complications  [] Other:           PLAN: See eval  [x] Continue per plan of care [] Alter current plan (see comments above)  [] Plan of care initiated [] Hold pending MD visit [] Discharge      Electronically signed by:  Tracee Galindo, PT, DPT, OCS, OMT-C    Board-Certified Orthopaedic Clinical Specialist    [place] PT license: [id number]  [place] PT license: [id number]      Note: If patient does not return for scheduled/ recommended follow up visits, this note will serve as a discharge from care along with most recent update on progress.

## 2022-08-03 ENCOUNTER — TREATMENT (OUTPATIENT)
Dept: PHYSICAL THERAPY | Age: 62
End: 2022-08-03
Payer: COMMERCIAL

## 2022-08-03 DIAGNOSIS — M54.41 CHRONIC LOW BACK PAIN WITH BILATERAL SCIATICA, UNSPECIFIED BACK PAIN LATERALITY: Primary | ICD-10-CM

## 2022-08-03 DIAGNOSIS — G89.29 CHRONIC LOW BACK PAIN WITH BILATERAL SCIATICA, UNSPECIFIED BACK PAIN LATERALITY: Primary | ICD-10-CM

## 2022-08-03 DIAGNOSIS — M79.604 BILATERAL LEG PAIN: ICD-10-CM

## 2022-08-03 DIAGNOSIS — M79.605 BILATERAL LEG PAIN: ICD-10-CM

## 2022-08-03 DIAGNOSIS — M48.062 SPINAL STENOSIS OF LUMBAR REGION WITH NEUROGENIC CLAUDICATION: ICD-10-CM

## 2022-08-03 DIAGNOSIS — M51.36 DDD (DEGENERATIVE DISC DISEASE), LUMBAR: ICD-10-CM

## 2022-08-03 DIAGNOSIS — M54.42 CHRONIC LOW BACK PAIN WITH BILATERAL SCIATICA, UNSPECIFIED BACK PAIN LATERALITY: Primary | ICD-10-CM

## 2022-08-03 PROCEDURE — 97112 NEUROMUSCULAR REEDUCATION: CPT | Performed by: PHYSICAL THERAPIST

## 2022-08-03 PROCEDURE — 97110 THERAPEUTIC EXERCISES: CPT | Performed by: PHYSICAL THERAPIST

## 2022-08-03 NOTE — PROGRESS NOTES
[facility]   Phone: [phone]    Fax: [phone]          Physical Therapy Treatment Note/ Progress Report:           Date:  8/3/2022    Patient Name:  Kannan Sagastume    :  1960  MRN: [id number]  Restrictions/Precautions:    Medical/Treatment Diagnosis Information:  Diagnosis: Lumbar stenosis w/ neurogenic claudication, Lumbar DDD, B leg pain     Insurance/Certification information:  PT Insurance Information: BCBS  Physician Information:  Referring Practitioner: Dr. Jose Gambino  Has the plan of care been signed (Y/N):        [x]  Yes  []  No     Date of Patient follow up with Physician: 8/15/2022 Stanislaw Linton PA-C)      Is this a Progress Report:     []  Yes  [x]  No        If Yes:  Date Range for reporting period:  Beginnin2022  Endin2022    Progress report will be due (10 Rx or 30 days whichever is less):        Recertification will be due (POC Duration  / 90 days whichever is less): 2022         Visit # Insurance Allowable Auth Required   3 30 []  Yes [x]  No        Functional Scale: FOTO score: 38    Date assessed:  2022      Latex Allergy:  [x]NO      []YES  Preferred Language for Healthcare:   [x]English       []other:      Pain level:  7/10         SUBJECTIVE:  Patient reports that there is no change in his symptoms. He reports that he got his MRI and the results are in his chart but he doesn't see the physician for a follow up until 8/15/2022. He reports again that he will be having a prostate procedure done next Monday and is unable to do anything actively for 2-3 weeks.         OBJECTIVE: See eval        ROM   Comments:  2022   Lumbar Flex 65%     Lumbar Ext neutral        ROM LEFT RIGHT Comments:  2022   Lumbar Side Bend 65% 65%     Lumbar Rotation 40% 40%     Hip Flexion         Hip Abd         Hip ER         Hip IR         Hip Extension         Knee Ext WNL WNL     Knee Flex WNL WNL     Hamstring Flex Piriformis                                Strength LEFT RIGHT Comments:  7/29/2022   Multifidus         Transverse Ab         Hip Flexors 4/5* 4/5*     Hip Abductors 4-/5* 4-/5* In sitting   Hip Adductors 3+/5* 3+/5* In sitting   Knee extension 4-/5* 4-/5* In sitting   Knee flexion 3+/5* 3+/5* In sitting   DF 4+/5* 4+/5        Myotomes Normal Abnormal Comments:  7/29/2022   [x]ALL NORMAL                   Hip flexion (L1-L2) []  []      Knee extension (L2-L4) []  []      Dorsiflexion (L4-L5) []  []      Great Toe Ext (L5) []  []      Ankle Eversion (S1-S2) []  []      Ankle PF(S1-S2) []  []         Dermatomes Normal Abnormal Comments:  7/29/2022   []ALL NORMAL                   inguinal area (L1) [x]  []      anterior mid-thigh (L2) []  [x]  Disrupted on R from fibromyalgia, not new   distal ant thigh/med knee (L3) [x]  []      medial lower leg and foot (L4) [x]  []      lateral lower leg and foot (L5) [x]  []      posterior calf (S1) [x]  []      medial calcaneus (S2) [x]  []            Reflexes Normal Abnormal Comments:  7/29/2022   [x]ALL NORMAL                   S1-2 Seated achilles []  []      S1-2 Prone knee bend []  []      L3-4 Patellar tendon []  []      C5-6 Biceps []  []      C6 Brachioradialis []  []      C7-8 Triceps []  []      Clonus []  []      Babinski []  []      Sharif's []  []         Neural dynamic tension testing Normal Abnormal Comments:  7/29/2022   Slump Test  - Degree of knee flexion:  []  [x]   Positive bilaterally with minimal flexion of spine or LE extension   SLR []  []      0-30 []  []      30-70 []  []      Femoral nerve (L2-4) []  []           Normal Abnormal N/A Comments:  7/29/2022   Fwd Bend-aberrant or innominate mvmt) []  []  []      Trendelenburg []  []  []      Kemps/Quadrant []  []  []      Stork []  []  []      SAQIB/Henry []  []  []      Hip scour []  []  []      Supine to sit []  []  []      Prone knee bend []  []  []                  Hip thrust []  []  []      SI distraction/compression []  []  []      Sacral Spring/thrust []  []  []                          RESTRICTIONS/PRECAUTIONS: chronic tachycardia, postconcussive syndrome, fibromyalgia        Exercises/Interventions:     Exercise/Equipment Resistance/Repetitions Other comments        Hamstring stretch 5x20\" EOT; bilaterally   Calf stretch 5x20\" bilaterally   Piriformis stretch     Knee to chest     Seated on heels low back stretch     Pelvic tilts     TrA contraction 10x10\"    TrA contraction with alt marches 2x10    Prone press ups     BS 10x10\"    B hip abd with TB Black, 10x10\"         Table walk back 10x10\"         Low lumbar rotation     bridges     Cat/camel      bird/dog     Opposite UE to LE isometric core     TrA/mutlifidi walk outs     TrA/multifidi push outs                         SB pikes     SB knee tucks     SB roll outs     planks                         Bike     Treadmill             Therapeutic Exercise and NMR EXR  [x] (93457) Provided verbal/tactile cueing for activities related to strengthening, flexibility, endurance, ROM  for improvements in proximal hip and core control with self care, mobility, lifting and ambulation. [x] (17271) Provided verbal/tactile cueing for activities related to improving balance, coordination, kinesthetic sense, posture, motor skill, proprioception  to assist with core control in self care, mobility, lifting, and ambulation.      Therapeutic Activities:    [] (56967 or 06152) Provided verbal/tactile cueing for activities related to improving balance, coordination, kinesthetic sense, posture, motor skill, proprioception and motor activation to allow for proper function  with self care and ADLs  [] (04934) Provided training and instruction to the patient for proper core and proximal hip recruitment and positioning with ambulation re-education     Home Exercise Program:    [x] (11275) Reviewed/Progressed HEP activities related to strengthening, flexibility, endurance, ROM (un) (45655):         ASSESSMENT: Patient remains very challenged with TA isometric holds while breathing naturally. He fatigues moderately with current exercise routine. He reports increasing pain in his neck upon completion of exercise today, but states no increased pain in the low back. PT and patient discussed keeping his HEP the same prior to his upcoming procedure and we will adjust his program following clearance to return to activity by surgeon and review of spinal MRI. GOALS:    Patient stated goal: decrease pain and improve function  [] Progressing: [] Met: [] Not Met: [] Adjusted  Therapist goals for Patient:   Short Term Goals: To be achieved in: 2 weeks  1. Independent in HEP and progression per patient tolerance, in order to prevent re-injury. [] Progressing: [] Met: [] Not Met: [] Adjusted  2. Patient will have a decrease in pain to facilitate improvement in movement, function, and ADLs as indicated by Functional Deficits. [] Progressing: [] Met: [] Not Met: [] Adjusted    Long Term Goals: To be achieved in: 6 weeks  1. Functional index score of 55 or more for FOTO to assist with reaching prior level of function. [] Progressing: [] Met: [] Not Met: [] Adjusted  2. Patient will demonstrate increased AROM to WNL, good LS mobility, good hip ROM to allow for proper joint functioning as indicated by patients Functional Deficits. [] Progressing: [] Met: [] Not Met: [] Adjusted  3. Patient will demonstrate an increase in Strength to good proximal hip and core activation to allow for proper functional mobility as indicated by patients Functional Deficits. [] Progressing: [] Met: [] Not Met: [] Adjusted  4. Patient will return to all bathing and dressing ADLs independently with 2/10 pain or less in lumbar spine so that he may return to PLOF for ADLs. [] Progressing: [] Met: [] Not Met: [] Adjusted  5.  Patient will be able to walk/stand >3 hours with 2/10 pain or less in low back so that he may return to PLOF for all ADLs. [] Progressing: [] Met: [] Not Met: [] Adjusted      Overall Progression Towards Functional goals/ Treatment Progress Update:  [] Patient is progressing as expected towards functional goals listed. [] Progression is slowed due to complexities/Impairments listed. [] Progression has been slowed due to co-morbidities. [x] Plan just implemented, too soon to assess goals progression <30days   [] Goals require adjustment due to lack of progress  [] Patient is not progressing as expected and requires additional follow up with physician  [] Other    Prognosis for POC: [x] Good [] Fair  [] Poor      Patient requires continued skilled intervention: [x] Yes  [] No    Treatment/Activity Tolerance:  [x] Patient able to complete treatment  [] Patient limited by fatigue  [] Patient limited by pain    [] Patient limited by other medical complications  [] Other:           PLAN: See eval  [x] Continue per plan of care [] Alter current plan (see comments above)  [] Plan of care initiated [] Hold pending MD visit [] Discharge      Electronically signed by:  Lucian Astudillo, PT, DPT, OCS, OMT-C    Board-Certified Orthopaedic Clinical Specialist    [facility] PT license: [id number]  [place] PT license: [id number]      Note: If patient does not return for scheduled/ recommended follow up visits, this note will serve as a discharge from care along with most recent update on progress.

## 2022-08-15 ENCOUNTER — TELEPHONE (OUTPATIENT)
Dept: ORTHOPEDIC SURGERY | Age: 62
End: 2022-08-15

## 2022-08-15 ENCOUNTER — OFFICE VISIT (OUTPATIENT)
Dept: ORTHOPEDIC SURGERY | Age: 62
End: 2022-08-15
Payer: COMMERCIAL

## 2022-08-15 VITALS — HEIGHT: 70 IN | BODY MASS INDEX: 36.65 KG/M2 | WEIGHT: 256 LBS

## 2022-08-15 DIAGNOSIS — R20.0 NUMBNESS AND TINGLING OF BOTH LEGS: ICD-10-CM

## 2022-08-15 DIAGNOSIS — G95.9 MYELOPATHY (HCC): ICD-10-CM

## 2022-08-15 DIAGNOSIS — R20.2 NUMBNESS AND TINGLING OF BOTH LEGS: ICD-10-CM

## 2022-08-15 DIAGNOSIS — G89.29 CHRONIC NECK PAIN: Primary | ICD-10-CM

## 2022-08-15 DIAGNOSIS — M54.2 CHRONIC NECK PAIN: Primary | ICD-10-CM

## 2022-08-15 PROCEDURE — 99204 OFFICE O/P NEW MOD 45 MIN: CPT | Performed by: PHYSICIAN ASSISTANT

## 2022-08-15 NOTE — TELEPHONE ENCOUNTER
General Question     Subject: patient call and stated he would like to have his EMG Referral fax over to [place] to  this number [phone] and the first available appointment is 08/31/22 Please Advise.   Patient Marissa Diez  Contact Number: [phone]

## 2022-08-15 NOTE — PROGRESS NOTES
5-16-17  Steroid injection  Pain subsided for a couple days, but now it is back to normal.  Another injection and option? Or should she see her back surgeon?   Colic  Colic is crying that lasts a long time for no known reason. The crying usually starts in the afternoon or evening. Your baby may be fussy or scream. Colic can last until your baby is 3 or 4 months old.  Follow these instructions at home:  · Check to see if your baby:  ¨ Is in an uncomfortable position.  ¨ Is too hot or cold.  ¨ Peed or pooped.  ¨ Needs to be cuddled.  · Rock your baby or take your baby for a ride in a stroller or car. Do not put your baby on a rocking or moving surface (such as a washing machine that is running). If your baby is still crying after 20 minutes, let your baby cry until he or she falls asleep.  · Play a CD of a sound that repeats over and over again. The sound could be from an electric fan, washing machine, or vacuum .  · Do not let your baby sleep more than 3 hours at a time during the day.  · Always put your baby on his or her back to sleep. Never put your baby face down or on the stomach to sleep.  · Never shake or hit your baby.  · If you are stressed:  ¨ Ask for help.  ¨ Have an adult you trust watch your baby. Then leave the house for a little while.  ¨ Put your baby in a crib where your baby is safe. Then leave the room and take a break.  Feeding  · Do not have drinks with caffeine (like tea, coffee, or pop) if you are breastfeeding.  · Burp your baby after each ounce of formula. If you are breastfeeding, burp your baby every 5 minutes.  · Always hold your baby while feeding. Always keep your baby sitting up for 30 minutes or more after a feeding.  · For each feeding, let your baby feed for at least 20 minutes.  · Do not feed your baby every time he or she cries. Wait at least 2 hours between feedings.  Contact a doctor if:  · Your baby seems to be in pain.  · Your baby acts sick.  · Your baby has been crying for more than 3 hours.  Get help right away if:  · You are scared that your stress will cause you to hurt your baby.  · You or someone else shook your  New Patient: SPINE    8/15/2022     CHIEF COMPLAINT:      HISTORY OF PRESENT ILLNESS:              The patient is a 62 y.o. male history of fibromyalgia, referred by Dr. Rudy Claudio MD for chronic low back and leg pain. He reports a history of chronic neck and low back pain--on going for many years. His symptoms have been worsening over the last 5-6 months. He describes throbbing and burning low back pain extending into the posterior lateral thighs/calves to the feet with numbness 50/50. He also reports intermittent neck pain with right upper extremity paresthesias. He reports subjective arm and leg weakness which have been ongoing. His symptoms are increased with sitting, standing, walking, bending. He reports some relief with resting. Conservative care includes PT, Celebrex, Lyrica, gabapentin, MDP. At times he does report some fine motor difficulty and gait instability. Denies progressive extremity weakness. Denies any recent injury or trauma. No recent fevers chills infections. Denies any recent bowel or bladder dysfunction or saddle anesthesia. The pain assessment was noted & reviewed in the medical record today. Current/Past Treatment:   Physical Therapy: Yes  Chiropractic:     Injection:     Medications:            NSAIDS: Celebrex            Muscle relaxer:              Steriods: MDP            Neuropathic medications: Lyrica, gabapentin have been discontinued            Opioids:            Other:   Surgery/Consult: No    Work Status/Functionality: Disability    Past Medical History: Medical history form was reviewed today & scanned into the media tab  No past medical history on file. Past Surgical History:     No past surgical history on file.   Current Medications:     Current Outpatient Medications:     allopurinol (ZYLOPRIM) 300 MG tablet, , Disp: , Rfl:     celecoxib (CELEBREX) 200 MG capsule, , Disp: , Rfl:     lisinopril (PRINIVIL;ZESTRIL) 5 MG tablet, , Disp: , Rfl: baby.  · Your child who is younger than 3 months has a fever.  · Your child who is older than 3 months has a fever and lasting problems.  · Your child who is older than 3 months has a fever and problems suddenly get worse.  This information is not intended to replace advice given to you by your health care provider. Make sure you discuss any questions you have with your health care provider.  Document Released: 10/15/2010 Document Revised: 2017 Document Reviewed: 2014  [employer] Interactive Patient Education © 2017 [employer]    [place] Rashes  Your 's skin goes through many changes during the first few weeks of life. Some of these changes may show up as areas of red, raised, or irritated skin (rash).   Many parents worry when their baby develops a rash, but many  rashes are completely normal and go away without treatment. Contact your health care provider if you have any concerns.  WHAT ARE SOME COMMON TYPES OF  RASHES?  Milia   · Many newborns get this kind of rash. It appears as tiny, hard, yellow or white lumps.  · Milia can appear on the:  ¨ Face.  ¨ Chest.  ¨ Back.  ¨ Penis.  ¨ Mucous membranes, such as in the nose or mouth.  Heat rash   · This is also commonly called prickly rash or sweaty rash.  · This blotchy red rash looks like small bumps and spots.  · It often shows up on parts of the body covered by clothing or diapers.  Erythema toxicum (E tox)   · E tox looks like small, yellow-colored blisters surrounded by redness on your baby's skin. The spots of the rash can be blotchy.  · This is the most common kind of rash and usually starts two or three days after birth.  · This rash can appear on the:  ¨ Face.  ¨ Chest.  ¨ Back.  ¨ Arms.  ¨ Legs.   acne   · This is a type of acne that often appears on a 's face, especially on the:  ¨ Forehead.  ¨ Nose.  ¨ Cheeks.  Pustular melanosis   · This is a less common  rash.  · It is more common in   topiramate (TOPAMAX) 25 MG tablet, Take 25 mg by mouth in the morning and 25 mg before bedtime. , Disp: , Rfl:     rOPINIRole (REQUIP) 0.5 MG tablet, Take 0.5 mg by mouth in the morning and 0.5 mg in the evening. Take with meals. , Disp: , Rfl:     Multiple Vitamin (MULTI-VITAMIN PO), Take by mouth, Disp: , Rfl:     methylPREDNISolone (MEDROL DOSEPACK) 4 MG tablet, Take by mouth as directed., Disp: 21 kit, Rfl: 0  Allergies:  Patient has no known allergies. Social History:      Family History:   No family history on file. REVIEW OF SYSTEMS: Full ROS reviewed & scanned into chart  CONSTITUTIONAL: Denies unexplained weight loss, fevers   SKIN: Denies active skin conditions   ENT: admits dizziness, nosebleeds  RESPIRATORY: Denies current dyspnea, difficulty breathing  CARDIOVASCULAR: Denies chest pain   NEUROLOGICAL: Denies stroke, or progressive weakness. Admits gait instability  PSYCHOLOGICAL: Denies anxiety, depression   HEMATOLOGIC: Denies blood disorders, cancer  ENDOCRINE: Denies excessive thirst, heat/cold  GI: Denies ulcer, nausea, vomiting, diarrhea   : Denies bowel or bladder incontinence       PHYSICAL EXAM:    Vitals:    8/15/2022 8:41 AM   Weight 256 lb (116.1 kg)   Height 5' 10\" (1.778 m)   Pain Score 6   Pain Loc Back       GENERAL EXAM:  General Apparence: Patient is adequately groomed with no evidence of malnutrition. Orientation: The patient is oriented to time, place and person. Mood & Affect:The patient's mood and affect are appropriate   Lymphatic: The lymphatic examination bilaterally reveals all areas to be without enlargement or induration  Sensation: Sensation is intact without deficit  Coordination/Balance: Good coordination     CERVICAL EXAMINATION:  Inspection: Local inspection shows no step-off or bruising.   Cervical alignment is normal.     Palpation: No focal tenderness at midline  Range of Motion: Intact flexion moderate loss of extension and lateral rotation  Strength: 5/5 American newborns.  · The blisters (pustules) in this rash are not surrounded by a blotchy red area.  · This rash can appear on any part of the body, even on the palms of the hands or soles of the feet.  WHAT CAUSES  RASHES?  Causes of  rashes may include:  · Natural changes in the skin after birth.  · Hormonal changes in the mother or baby after birth.  · Infections from the germs that cause herpes, strep throat, and yeast infections.     · Overheating.  · Underlying health problems.  · Allergies.  · Skin irritation in dark, damp areas such as in the diaper area and armpits (axilla).  DO  RASHES CAUSE ANY PAIN?  Rashes can be irritating and itchy or become painful if they get infected. Contact your baby's health care provider if your baby has a rash and is becoming fussy or seems uncomfortable.  HOW ARE  RASHES DIAGNOSED?  To diagnose a rash, your baby's health care provider will:  · Do a physical exam.  · Consider your baby's other symptoms and overall health.  · Take a sample of fluid from any pustules to test in a lab if necessary.  DO  RASHES REQUIRE TREATMENT?  Many  rashes go away on their own. Some may require treatment, including:  · Changing bathing and clothing routines.  · Using over-the-counter lotions or a cleanser for sensitive skin.  · Lotions and ointments as prescribed by your baby's health care provider.  WHAT SHOULD I DO IF I THINK MY BABY HAS A  RASH?  Talk to your health care provider if you are concerned about your 's rash. You can take these steps to care for your 's skin:  · Bathe your baby in lukewarm or cool water.  · Do not let your child overheat.  · Use recommended lotions or ointments as directed by your health care provider.  CAN  RASHES BE PREVENTED?  You can prevent some  rashes by:  · Using skin products for sensitive skin.  · Washing your baby only a few times a week.  · Using a gentle cloth for  bilateral upper extremities exception of left finger abduction 5 -/5  Special Tests:      Spurling's positive on the right, L'Hermitte's-negative   Sharif's POSITIVE bilaterally. Cubital and Carpal tunnel Tinel's negative bilaterally. Skin:There are no rashes, ulcerations or lesions in right & left upper extremities. Reflexes: Bilaterally triceps, biceps and brachioradialis are slightly brisk but symmetric 2-3+. Clonus absent bilaterally at the feet. Additional Examinations:       RIGHT UPPER EXTREMITY:  Inspection/examination of the right upper extremity does not show any tenderness, deformity or injury. Range of motion is full. There is no gross instability. There are no rashes, ulcerations or lesions. Strength and tone are normal.  LEFT UPPER EXTREMITY: Inspection/examination of the left upper extremity does not show any tenderness, deformity or injury. Range of motion is full. There is no gross instability. There are no rashes, ulcerations or lesions. LUMBAR/SACRAL EXAMINATION:  Inspection: Local inspection shows no step-off or bruising. Lumbar alignment is normal.  Sagittal and Coronal balance is neutral.      Palpation:   Diffuse lumbosacral tenderness to palpation. No focal tenderness at midline  Range of Motion: Moderate loss of flexion severely limited extension due to pain and guarding  Strength:   Strength testing is 5-/5 in all muscle groups tested. Special Tests:   Straight leg raise and crossed SLR negative. Leg length and pelvis level.  0 out of 5 Agnieszka's signs. Skin: There are no rashes, ulcerations or lesions. Reflexes: Reflexes are symmetrically slightly brisk 2-3+ at the patellar and 2+ ankle tendons. Clonus absent bilaterally at the feet. Gait & station: Slightly forward unassisted  Additional Examinations:   RIGHT LOWER EXTREMITY: Inspection/examination of the right lower extremity does not show any tenderness, deformity or injury. Range of motion is full. There is no gross instability. There are no rashes, ulcerations or lesions. Strength and tone are normal.    LEFT LOWER EXTREMITY:  Inspection/examination of the left lower extremity does not show any tenderness, deformity or injury. Range of motion is full. There is no gross instability. There are no rashes, ulcerations or lesions. Strength and tone are normal.      Diagnostic Testing:    Lumbar MRI scan report independently reviewed August 2022 showing multilevel DDD/spondylosis with disc bulging and multilevel varying degrees of foraminal stenosis. L5-S1 DDD with Modic changes moderate right and mild left foraminal stenosis, moderate right foraminal stenosis L4-5, multilevel facet arthropathy, S1 lumbarization per report    2 view cervical spine 8/15/2022 mild multilevel DDD/spondylosis. No malalignment. C7 not fully visualized. Dr. Martine Hammond notes reviewed    Labs reviewed April 2022 showing elevated BUN and creatinine 39 and 1.4        Impression:  1) Chronic LBP, bilateral lumbar radiculitis  2) multilevel lumbar DDD with foraminal stenosis  3) Chronic neck pain, right UE paresthesias  4) Clinical myelopathy  5) Fibro--seeing rheumatology       Plan:   1) We had a long discussion. We obtained 2 views of his cervical spine today and discussed in detail. I recommend cervical and thoracic MRI scans to rule out higher cord pathology given his symptoms. He does have some evidence of myelopathy on his clinical examination today.   2) PT for above, con't  3) Bilateral LE EMGs  4) Cont Celebrex; declining Pred taper  5) Discussed concerning s/sx  6) F/u to review MRI/EMGs           Tressa Springer PA-C, MPAS  Board Certified by the [facility] Shashank Barahona cleansing.  · Patting your baby's skin dry after bathing. Avoid rubbing the skin.  · Using a moisturizer for sensitive skin.  · Preventing overheating, such as taking off extra clothing.  · Do not use baby powder to dry damp areas. Breathing in baby powder is not safe for your baby. Your baby's health care provider may advise you instead to sprinkle a small amount of talcum powder in moist areas.  This information is not intended to replace advice given to you by your health care provider. Make sure you discuss any questions you have with your health care provider.  Document Released: 11/07/2007 Document Revised: 09/07/2016 Document Reviewed: 04/03/2015  [employer] Interactive Patient Education © 2017 [employer]

## 2022-08-18 NOTE — TELEPHONE ENCOUNTER
Called & spoke with the patient informing him that I got his call and sorry for the delayed response. I informed him that his EMG has been faxed over to [facility], Patient voiced understanding. He asked about his MRIs. I informed him they are approved and will be faxed over to [address] [place] in [place]. Patient voiced understanding and will be seen back after the procedures.

## 2022-08-22 LAB
CHOLESTEROL, TOTAL: 148 MG/DL (ref 0–199)
GLUCOSE BLD-MCNC: 95 MG/DL (ref 70–99)
HDLC SERPL-MCNC: 31 MG/DL (ref 40–60)
LDL CHOLESTEROL CALCULATED: 100 MG/DL
TRIGL SERPL-MCNC: 85 MG/DL (ref 0–150)

## 2022-08-23 ENCOUNTER — TREATMENT (OUTPATIENT)
Dept: PHYSICAL THERAPY | Age: 62
End: 2022-08-23
Payer: COMMERCIAL

## 2022-08-23 DIAGNOSIS — G89.29 CHRONIC LOW BACK PAIN WITH BILATERAL SCIATICA, UNSPECIFIED BACK PAIN LATERALITY: Primary | ICD-10-CM

## 2022-08-23 DIAGNOSIS — M51.36 DDD (DEGENERATIVE DISC DISEASE), LUMBAR: ICD-10-CM

## 2022-08-23 DIAGNOSIS — M79.605 BILATERAL LEG PAIN: ICD-10-CM

## 2022-08-23 DIAGNOSIS — M48.062 SPINAL STENOSIS OF LUMBAR REGION WITH NEUROGENIC CLAUDICATION: ICD-10-CM

## 2022-08-23 DIAGNOSIS — M54.41 CHRONIC LOW BACK PAIN WITH BILATERAL SCIATICA, UNSPECIFIED BACK PAIN LATERALITY: Primary | ICD-10-CM

## 2022-08-23 DIAGNOSIS — M79.604 BILATERAL LEG PAIN: ICD-10-CM

## 2022-08-23 DIAGNOSIS — M54.42 CHRONIC LOW BACK PAIN WITH BILATERAL SCIATICA, UNSPECIFIED BACK PAIN LATERALITY: Primary | ICD-10-CM

## 2022-08-23 PROCEDURE — 97530 THERAPEUTIC ACTIVITIES: CPT | Performed by: PHYSICAL THERAPIST

## 2022-08-23 PROCEDURE — G8427 DOCREV CUR MEDS BY ELIG CLIN: HCPCS | Performed by: PHYSICAL THERAPIST

## 2022-08-23 PROCEDURE — 97112 NEUROMUSCULAR REEDUCATION: CPT | Performed by: PHYSICAL THERAPIST

## 2022-08-23 PROCEDURE — 97110 THERAPEUTIC EXERCISES: CPT | Performed by: PHYSICAL THERAPIST

## 2022-08-23 NOTE — PROGRESS NOTES
Hip IR         Hip Extension         Knee Ext WNL WNL     Knee Flex WNL WNL     Hamstring Flex         Piriformis                                Strength LEFT RIGHT Comments:  7/29/2022   Multifidus         Transverse Ab         Hip Flexors 4/5* 4/5*     Hip Abductors 4-/5* 4-/5* In sitting   Hip Adductors 3+/5* 3+/5* In sitting   Knee extension 4-/5* 4-/5* In sitting   Knee flexion 3+/5* 3+/5* In sitting   DF 4+/5* 4+/5        Myotomes Normal Abnormal Comments:  7/29/2022   [x]ALL NORMAL                   Hip flexion (L1-L2) []  []      Knee extension (L2-L4) []  []      Dorsiflexion (L4-L5) []  []      Great Toe Ext (L5) []  []      Ankle Eversion (S1-S2) []  []      Ankle PF(S1-S2) []  []         Dermatomes Normal Abnormal Comments:  7/29/2022   []ALL NORMAL                   inguinal area (L1) [x]  []      anterior mid-thigh (L2) []  [x]  Disrupted on R from fibromyalgia, not new   distal ant thigh/med knee (L3) [x]  []      medial lower leg and foot (L4) [x]  []      lateral lower leg and foot (L5) [x]  []      posterior calf (S1) [x]  []      medial calcaneus (S2) [x]  []            Reflexes Normal Abnormal Comments:  7/29/2022   [x]ALL NORMAL                   S1-2 Seated achilles []  []      S1-2 Prone knee bend []  []      L3-4 Patellar tendon []  []      C5-6 Biceps []  []      C6 Brachioradialis []  []      C7-8 Triceps []  []      Clonus []  []      Babinski []  []      Sharif's []  []         Neural dynamic tension testing Normal Abnormal Comments:  7/29/2022   Slump Test  - Degree of knee flexion:  []  [x]   Positive bilaterally with minimal flexion of spine or LE extension   SLR []  []      0-30 []  []      30-70 []  []      Femoral nerve (L2-4) []  []           Normal Abnormal N/A Comments:  7/29/2022   Fwd Bend-aberrant or innominate mvmt) []  []  []      Trendelenburg []  []  []      Sumeet/Alexander []  []  []      Neelam Copper []  []  []      SAQIB/Henry []  []  []      Hip scour []  []  [] Supine to sit []  []  []      Prone knee bend []  []  []                  Hip thrust []  []  []      SI distraction/compression []  []  []      S[place]/thrust []  []  []                          RESTRICTIONS/PRECAUTIONS: chronic tachycardia, postconcussive syndrome, fibromyalgia        Exercises/Interventions:     Exercise/Equipment Resistance/Repetitions Other comments        Hamstring stretch 5x20\" EOT; bilaterally   Calf stretch 5x20\" bilaterally   Piriformis stretch     Knee to chest     Seated on heels low back stretch     Pelvic tilts     TrA contraction 10x10\"    TrA contraction with alt marches 2x10    Prone press ups     BS 10x10\"    B hip abd with TB Black, 10x10\"         Table walk back 10x10\"         Low lumbar rotation 10X10\" ea side Feet on red SB   bridges 2x10    Cat/camel      bird/dog     Opposite UE to LE isometric core     TrA/mutlifidi walk outs Add NPV    TrA/multifidi push outs                         SB pikes     SB knee tucks     SB roll outs     planks                         Bike     Treadmill             Therapeutic Exercise and NMR EXR  [x] (07187) Provided verbal/tactile cueing for activities related to strengthening, flexibility, endurance, ROM  for improvements in proximal hip and core control with self care, mobility, lifting and ambulation. [x] (06863) Provided verbal/tactile cueing for activities related to improving balance, coordination, kinesthetic sense, posture, motor skill, proprioception  to assist with core control in self care, mobility, lifting, and ambulation.      Therapeutic Activities:    [] (28498 or 08488) Provided verbal/tactile cueing for activities related to improving balance, coordination, kinesthetic sense, posture, motor skill, proprioception and motor activation to allow for proper function  with self care and ADLs  [] (66940) Provided training and instruction to the patient for proper core and proximal hip recruitment and positioning with ambulation re-education     Home Exercise Program:    [x] (88291) Reviewed/Progressed HEP activities related to strengthening, flexibility, endurance, ROM of core, proximal hip and LE for functional self-care, mobility, lifting and ambulation     Access Code: [email or web address]  URL: [email or web address]  Date: 07/29/2022  Prepared by: [place] [facility]    Exercises  Long Sitting Calf Stretch with Strap - 1-2 x daily - 5-7 x weekly - 1 sets - 5 reps - 20 seconds hold  Seated Table Hamstring Stretch - 1-2 x daily - 5-7 x weekly - 1 sets - 5 reps - 20 seconds hold  Long Sitting Hip Adduction Isometric with Ball - 1-2 x daily - 5-7 x weekly - 1 sets - 10 reps - 10 seconds hold  Supine Transversus Abdominis Bracing - Hands on Stomach - 1-2 x daily - 5-7 x weekly - 1 sets - 10 reps - 10 seconds hold  Standing Shoulder and Trunk Flexion at Table - 1-2 x daily - 5-7 x weekly - 2 sets - 10 reps - 10 seconds hold      [] (48544) Reviewed/Progressed HEP activities related to improving balance, coordination, kinesthetic sense, posture, motor skill, proprioception of core, proximal hip and LE for self care, mobility, lifting, and ambulation      Manual Treatments:  PROM / STM / Oscillations-Mobs:  G-I, II, III, IV (PA's, Inf., Post.)  [] (68407) Provided manual therapy to mobilize proximal hip and LS spine soft tissue/joints for the purpose of modulating pain, promoting relaxation,  increasing ROM, reducing/eliminating soft tissue swelling/inflammation/restriction, improving soft tissue extensibility and allowing for proper ROM for normal function with self care, mobility, lifting and ambulation.      Modalities: MHP to back x12'    Charges:  Timed Code Treatment Minutes: 44   Total Treatment Minutes: 56     [] EVAL (LOW) 52939 (typically 20 minutes face-to-face)  [] EVAL (MOD) 25530 (typically 30 minutes face-to-face)   [] EVAL (HIGH) 22743 (typically 45 minutes face-to-face)  [] RE-EVAL   [x] TY(90716) x 1 (22')  [] IONTO  [x] NMR (84597) x  1 (12')   [] VASO  [] Manual (08535) x      [] Other:  [x] TA (23523) x 1 (10')  [] Mech Traction (21469)  [] ES(attended) (57333)      [] ES (un) (09425):         ASSESSMENT: Patient has significant difficulty with completing supine bridge today, reporting severe weakness in the LEs. He requires mod-max cuing from PT to control motion while lowering from bridge and avoid \"flopping\" down. He tolerates introduction of LTRs well, though motion is severely limited. He was instructed in continuing with ROM and stability exercises at tolerated at home. GOALS:    Patient stated goal: decrease pain and improve function  [] Progressing: [] Met: [] Not Met: [] Adjusted  Therapist goals for Patient:   Short Term Goals: To be achieved in: 2 weeks  1. Independent in HEP and progression per patient tolerance, in order to prevent re-injury. [] Progressing: [] Met: [] Not Met: [] Adjusted  2. Patient will have a decrease in pain to facilitate improvement in movement, function, and ADLs as indicated by Functional Deficits. [] Progressing: [] Met: [] Not Met: [] Adjusted    Long Term Goals: To be achieved in: 6 weeks  1. Functional index score of 55 or more for FOTO to assist with reaching prior level of function. [] Progressing: [] Met: [] Not Met: [] Adjusted  2. Patient will demonstrate increased AROM to WNL, good LS mobility, good hip ROM to allow for proper joint functioning as indicated by patients Functional Deficits. [] Progressing: [] Met: [] Not Met: [] Adjusted  3. Patient will demonstrate an increase in Strength to good proximal hip and core activation to allow for proper functional mobility as indicated by patients Functional Deficits. [] Progressing: [] Met: [] Not Met: [] Adjusted  4. Patient will return to all bathing and dressing ADLs independently with 2/10 pain or less in lumbar spine so that he may return to PLOF for ADLs. [] Progressing: [] Met: [] Not Met: [] Adjusted  5.  Patient will be able to walk/stand >3 hours with 2/10 pain or less in low back so that he may return to PLOF for all ADLs. [] Progressing: [] Met: [] Not Met: [] Adjusted      Overall Progression Towards Functional goals/ Treatment Progress Update:  [] Patient is progressing as expected towards functional goals listed. [] Progression is slowed due to complexities/Impairments listed. [] Progression has been slowed due to co-morbidities. [x] Plan just implemented, too soon to assess goals progression <30days   [] Goals require adjustment due to lack of progress  [] Patient is not progressing as expected and requires additional follow up with physician  [] Other    Prognosis for POC: [x] Good [] Fair  [] Poor      Patient requires continued skilled intervention: [x] Yes  [] No    Treatment/Activity Tolerance:  [x] Patient able to complete treatment  [] Patient limited by fatigue  [] Patient limited by pain    [] Patient limited by other medical complications  [] Other:           PLAN: See eval  [x] Continue per plan of care [] Alter current plan (see comments above)  [] Plan of care initiated [] Hold pending MD visit [] Discharge      Electronically signed by:  Cait Jensen, PT, DPT, OCS, OMT-C    Board-Certified Orthopaedic Clinical Specialist    [place] PT license: [id number]  [place] PT license: [id number]      Note: If patient does not return for scheduled/ recommended follow up visits, this note will serve as a discharge from care along with most recent update on progress.

## 2022-08-29 ENCOUNTER — TELEPHONE (OUTPATIENT)
Dept: ORTHOPEDIC SURGERY | Age: 62
End: 2022-08-29

## 2022-08-29 NOTE — TELEPHONE ENCOUNTER
Appointment Request     Patient requesting earlier appointment: Yes  Appointment offered to patient: 09/26  Patient Contact Number: [phone]    Patient requesting an appointment in [facility] for MRI and EMG results.

## 2022-08-30 NOTE — TELEPHONE ENCOUNTER
Called & spoke with the patient informing him I was returning his call in regards to an earlier appointment, patient was informed that we can schedule his follow up on 9/12/2022 at the [facility] office, patient agreed and selected the 10:30AM slot. Patient voiced understanding. I will get in touch with Dustin in regards to MRI should we not have his results. Patient voiced understanding.

## 2022-09-12 ENCOUNTER — OFFICE VISIT (OUTPATIENT)
Dept: ORTHOPEDIC SURGERY | Age: 62
End: 2022-09-12
Payer: COMMERCIAL

## 2022-09-12 VITALS — BODY MASS INDEX: 36.65 KG/M2 | WEIGHT: 256 LBS | HEIGHT: 70 IN

## 2022-09-12 DIAGNOSIS — M51.36 DDD (DEGENERATIVE DISC DISEASE), LUMBAR: ICD-10-CM

## 2022-09-12 DIAGNOSIS — M79.604 BILATERAL LEG PAIN: ICD-10-CM

## 2022-09-12 DIAGNOSIS — M48.062 SPINAL STENOSIS OF LUMBAR REGION WITH NEUROGENIC CLAUDICATION: ICD-10-CM

## 2022-09-12 DIAGNOSIS — R20.0 NUMBNESS AND TINGLING OF BOTH LEGS: Primary | ICD-10-CM

## 2022-09-12 DIAGNOSIS — R20.2 NUMBNESS AND TINGLING OF BOTH LEGS: Primary | ICD-10-CM

## 2022-09-12 DIAGNOSIS — M79.605 BILATERAL LEG PAIN: ICD-10-CM

## 2022-09-12 PROCEDURE — 99214 OFFICE O/P EST MOD 30 MIN: CPT | Performed by: PHYSICIAN ASSISTANT

## 2022-09-12 NOTE — PROGRESS NOTES
Follow-up: SPINE    9/12/2022     CHIEF COMPLAINT: Low back and leg pain follow-up MRIs    HISTORY OF PRESENT ILLNESS:              The patient is a 62 y.o. male history of fibromyalgia, initially referred by Dr. Cassidy Bazzi MD here to review spine MRIs for chronic low back, neck and leg pain. He reports a history of chronic neck and low back pain--on going for many years. His symptoms have been worsening over the last several months. He describes throbbing and burning low back pain extending into the posterior lateral thighs/calves to the feet with numbness 50/50. He also reports intermittent neck pain with right upper extremity paresthesias. He reports subjective arm and leg weakness which have been ongoing for years. His symptoms are increased with sitting, standing, walking, bending. He reports some relief with resting. He recently underwent chiropractic manipulation with improvement of his neck and back pain. Other conservative care includes PT, Celebrex, Lyrica, gabapentin, MDP. At times he does report some fine motor difficulty and gait instability. Denies progressive extremity weakness. Denies any recent injury or trauma. No recent fevers chills infections. Denies any recent bowel or bladder dysfunction or saddle anesthesia. Of note he states he has had a prior thorough neurology evaluation years prior with     The pain assessment was noted & reviewed in the medical record today. Current/Past Treatment:   Physical Therapy: Yes  Chiropractic:   Yes with improvement  Injection:     Medications:            NSAIDS: Celebrex            Muscle relaxer:              Steriods: MDP            Neuropathic medications: Lyrica, gabapentin have been discontinued            Opioids:            Other:   Surgery/Consult: No    Work Status/Functionality: Disability    Past Medical History: Medical history form was reviewed today & scanned into the media tab  No past medical history on file.    Past Surgical History:     No past surgical history on file. Current Medications:     Current Outpatient Medications:     allopurinol (ZYLOPRIM) 300 MG tablet, , Disp: , Rfl:     celecoxib (CELEBREX) 200 MG capsule, , Disp: , Rfl:     lisinopril (PRINIVIL;ZESTRIL) 5 MG tablet, , Disp: , Rfl:     topiramate (TOPAMAX) 25 MG tablet, Take 25 mg by mouth in the morning and 25 mg before bedtime. , Disp: , Rfl:     rOPINIRole (REQUIP) 0.5 MG tablet, Take 0.5 mg by mouth in the morning and 0.5 mg in the evening. Take with meals. , Disp: , Rfl:     Multiple Vitamin (MULTI-VITAMIN PO), Take by mouth, Disp: , Rfl:     methylPREDNISolone (MEDROL DOSEPACK) 4 MG tablet, Take by mouth as directed., Disp: 21 kit, Rfl: 0  Allergies:  Patient has no known allergies. Social History:      Family History:   No family history on file. REVIEW OF SYSTEMS: Full ROS reviewed & scanned into chart  CONSTITUTIONAL: Denies unexplained weight loss, fevers   SKIN: Denies active skin conditions       PHYSICAL EXAM:    Vitals:    9/12/2022 10:33 AM   Weight 256 lb (116.1 kg)   Height 5' 10\" (1.778 m)   Pain Score 8   Pain Loc Back     GENERAL EXAM:  General Apparence: Patient is adequately groomed with no evidence of malnutrition. Orientation: The patient is oriented to time, place and person. Mood & Affect:The patient's mood and affect are appropriate   Lymphatic: The lymphatic examination bilaterally reveals all areas to be without enlargement or induration  Sensation: Sensation is intact without deficit  Coordination/Balance: Good coordination     CERVICAL EXAMINATION:  Inspection: Local inspection shows no step-off or bruising.   Cervical alignment is normal.     Palpation: No focal tenderness at midline  Range of Motion: Intact flexion moderate loss of extension and lateral rotation  Strength: 5/5 bilateral upper extremities exception of left finger abduction 5 -/5  Special Tests:      Spurling's positive on the right, L'Hermitte's-negative Sharif's POSITIVE bilaterally. Cubital and Carpal tunnel Tinel's negative bilaterally. Skin:There are no rashes, ulcerations or lesions in right & left upper extremities. Reflexes: Bilaterally triceps, biceps and brachioradialis are slightly brisk but symmetric 2-3+. Clonus absent bilaterally at the feet. LUMBAR/SACRAL EXAMINATION:  Inspection: Local inspection shows no step-off or bruising. Lumbar alignment is normal.  Sagittal and Coronal balance is neutral.      Palpation:   Diffuse lumbosacral tenderness to palpation--right lumbar paraspinals. No focal tenderness at midline  Range of Motion: Moderate loss of flexion, moderate to severely limited extension  Strength:   Strength testing is 5-/5 in all muscle groups tested. Special Tests:   Straight leg raise and crossed SLR negative. Leg length and pelvis level.  0 out of 5 Agnieszka's signs. Skin: There are no rashes, ulcerations or lesions. Reflexes: Reflexes are symmetrically slightly brisk 2-3+ at the patellar and 2+ ankle tendons. Clonus absent bilaterally at the feet. Gait & station: Slightly forward unassisted  Additional Examinations:   RIGHT LOWER EXTREMITY: Inspection/examination of the right lower extremity does not show any tenderness, deformity or injury. Range of motion is full. There is no gross instability. There are no rashes, ulcerations or lesions. Strength and tone are normal.  LEFT LOWER EXTREMITY:  Inspection/examination of the left lower extremity does not show any tenderness, deformity or injury. Range of motion is full. There is no gross instability. There are no rashes, ulcerations or lesions. Strength and tone are normal.      Diagnostic Testing:    Cervical MRI scan report independently reviewed August 2022 showing no abnormal cord signal.  Multilevel cervical DDD/spondylosis with varying degrees of foraminal stenosis--moderate left foraminal stenosis C5-6 otherwise mild.   Incidentally noted right thyroid nodule suspected    Thoracic MRI scan report independently reviewed August 2022 showing no abnormal cord signal, stable T12 compression deformity    EMGs bilateral lower extremity reports reviewed August 2022 no evidence of lumbosacral radiculopathy, plexopathy or peripheral neuropathy    Lumbar MRI scan report independently reviewed August 2022 showing multilevel DDD/spondylosis with disc bulging and multilevel varying degrees of foraminal stenosis. L5-S1 DDD with Modic changes moderate right and mild left foraminal stenosis, moderate right foraminal stenosis L4-5, multilevel facet arthropathy, S1 lumbarization per report    MRI brain 2015 Progressive frontoparietal cerebral atrophy. Two nonspecific white matter lesions stable since 2008. Doubt multiple sclerosis. See report    2 view cervical spine 8/15/2022 mild multilevel DDD/spondylosis. No malalignment. C7 not fully visualized. Dr. Nicholas Rincon notes reviewed    Labs reviewed April 2022 showing elevated BUN and creatinine 39 and 1.4        Impression:  1) Chronic LBP, bilateral lumbar radiculitis  2) Multilevel lumbar DDD with foraminal stenosis  3) Chronic neck pain, right UE paresthesias--underlying cervical DDD without cord compression or cord signal changes  4) Clinical myelopathy  5) Fibro--seeing rheumatology         Plan:   1) We had a long discussion. We reviewed his recent EMGs, cervical and thoracic MRI scans today in detail. We discussed there is no evidence of cord compression or cord signal changes. I recommend reevaluation with neurology, referral placed. 2) As for his low back and radiating leg pain we discussed midline L5-S1 LESI #1. Procedure risk and benefits discussed. Pamphlet provided for more information. Will order with Dr. Leblanc Given. He understands this will alleviate symptoms originating from his lumbar spine only.     3) He is also wishing to seek pain management closer to his home--he gives me information for

## 2022-09-12 NOTE — LETTER
[address] [facility]    Please Schedule the following with:DONNA    Today's Date:  9/12/22     Patient: Marissa Diez     YOB: 1960    Patient Home Phone: [phone] (home)    Diagnosis: Multilevel lumbar DDD/spondylosis, L5-S1 DDD with Modic changes and foraminal stenosis bilaterally, lumbar radiculitis, S1 lumbarization    []LT     []RT     []WILBERTO     [x]Midline    Levels: L5 S1 #1  []Cervical JORDI 25185, 07794  []L-MBB 54841, 88977  []SI Joint 19602   []C-FACET 44447, 04190, 96091  []L-FACET [id number], [id number]  [x]Interlaminar JORDI 90990     []HIP 75317    []C-MBB  []Transforaminal JORDI 96405  []Neurotomy 66357, 44273, 46407    Attending Provider: Durga Conti PA-C    Injection Schedule for: At:  [facility]  First Insurance:                                               Pre-cert #:  Second Insurance:                 Pre-cert #:    Comments: Follow up with ISAAC Cifuentes 2weeks after procedure: [phone]    SEDATION:       [] IV           [] ORAL     [] Blood Thinner:                 []Diabetic           []Antibiotic:               []Glaucoma:    [] Pacemaker/defib       [] Current Open Wounds, Lacerations or Sores     Allergies: No Known Allergies    No past medical history on file. Current Outpatient Medications:     allopurinol (ZYLOPRIM) 300 MG tablet, , Disp: , Rfl:     celecoxib (CELEBREX) 200 MG capsule, , Disp: , Rfl:     lisinopril (PRINIVIL;ZESTRIL) 5 MG tablet, , Disp: , Rfl:     topiramate (TOPAMAX) 25 MG tablet, Take 25 mg by mouth in the morning and 25 mg before bedtime. , Disp: , Rfl:     rOPINIRole (REQUIP) 0.5 MG tablet, Take 0.5 mg by mouth in the morning and 0.5 mg in the evening. Take with meals. , Disp: , Rfl:     Multiple Vitamin (MULTI-VITAMIN PO), Take by mouth, Disp: , Rfl:     methylPREDNISolone (MEDROL DOSEPACK) 4 MG tablet, Take by mouth as directed., Disp: 21 kit, Rfl: 0

## 2022-09-14 RX ORDER — DIPHENHYDRAMINE HCL 25 MG
25 CAPSULE ORAL EVERY 6 HOURS PRN
COMMUNITY

## 2022-09-20 ENCOUNTER — NURSE TRIAGE (OUTPATIENT)
Dept: OTHER | Facility: CLINIC | Age: 62
End: 2022-09-20

## 2022-09-20 ENCOUNTER — HOSPITAL ENCOUNTER (EMERGENCY)
Age: 62
Discharge: HOME | End: 2022-09-20
Attending: EMERGENCY MEDICINE
Payer: COMMERCIAL

## 2022-09-20 ENCOUNTER — APPOINTMENT (OUTPATIENT)
Dept: CT IMAGING | Age: 62
End: 2022-09-20
Payer: COMMERCIAL

## 2022-09-20 ENCOUNTER — APPOINTMENT (OUTPATIENT)
Dept: GENERAL RADIOLOGY | Age: 62
End: 2022-09-20
Payer: COMMERCIAL

## 2022-09-20 VITALS
WEIGHT: 260 LBS | SYSTOLIC BLOOD PRESSURE: 137 MMHG | OXYGEN SATURATION: 100 % | BODY MASS INDEX: 37.31 KG/M2 | TEMPERATURE: 98 F | HEART RATE: 74 BPM | RESPIRATION RATE: 19 BRPM | DIASTOLIC BLOOD PRESSURE: 93 MMHG

## 2022-09-20 DIAGNOSIS — R42 VERTIGO: Primary | ICD-10-CM

## 2022-09-20 LAB
ANION GAP SERPL CALCULATED.3IONS-SCNC: 10 MMOL/L (ref 3–16)
BASOPHILS ABSOLUTE: 0 K/UL (ref 0–0.2)
BASOPHILS RELATIVE PERCENT: 0.7 %
BUN BLDV-MCNC: 30 MG/DL (ref 7–20)
CALCIUM SERPL-MCNC: 9.1 MG/DL (ref 8.3–10.6)
CHLORIDE BLD-SCNC: 108 MMOL/L (ref 99–110)
CO2: 21 MMOL/L (ref 21–32)
CREAT SERPL-MCNC: 1.5 MG/DL (ref 0.8–1.3)
EKG ATRIAL RATE: 54 BPM
EKG DIAGNOSIS: NORMAL
EKG P AXIS: 44 DEGREES
EKG P-R INTERVAL: 176 MS
EKG Q-T INTERVAL: 416 MS
EKG QRS DURATION: 100 MS
EKG QTC CALCULATION (BAZETT): 394 MS
EKG R AXIS: 44 DEGREES
EKG T AXIS: 20 DEGREES
EKG VENTRICULAR RATE: 54 BPM
EOSINOPHILS ABSOLUTE: 0.2 K/UL (ref 0–0.6)
EOSINOPHILS RELATIVE PERCENT: 4.6 %
GFR AFRICAN AMERICAN: 58
GFR NON-AFRICAN AMERICAN: 48
GLUCOSE BLD-MCNC: 93 MG/DL (ref 70–99)
HCT VFR BLD CALC: 42.6 % (ref 40.5–52.5)
HEMOGLOBIN: 13.9 G/DL (ref 13.5–17.5)
LYMPHOCYTES ABSOLUTE: 1.8 K/UL (ref 1–5.1)
LYMPHOCYTES RELATIVE PERCENT: 36.3 %
MCH RBC QN AUTO: 29.8 PG (ref 26–34)
MCHC RBC AUTO-ENTMCNC: 32.7 G/DL (ref 31–36)
MCV RBC AUTO: 91.2 FL (ref 80–100)
MONOCYTES ABSOLUTE: 0.4 K/UL (ref 0–1.3)
MONOCYTES RELATIVE PERCENT: 7.9 %
NEUTROPHILS ABSOLUTE: 2.5 K/UL (ref 1.7–7.7)
NEUTROPHILS RELATIVE PERCENT: 50.5 %
PDW BLD-RTO: 15.5 % (ref 12.4–15.4)
PLATELET # BLD: 145 K/UL (ref 135–450)
PMV BLD AUTO: 7.4 FL (ref 5–10.5)
POTASSIUM REFLEX MAGNESIUM: 4.4 MMOL/L (ref 3.5–5.1)
RBC # BLD: 4.67 M/UL (ref 4.2–5.9)
SODIUM BLD-SCNC: 139 MMOL/L (ref 136–145)
TROPONIN: <0.01 NG/ML
WBC # BLD: 4.9 K/UL (ref 4–11)

## 2022-09-20 PROCEDURE — 6360000004 HC RX CONTRAST MEDICATION: Performed by: EMERGENCY MEDICINE

## 2022-09-20 PROCEDURE — 70496 CT ANGIOGRAPHY HEAD: CPT

## 2022-09-20 PROCEDURE — 6370000000 HC RX 637 (ALT 250 FOR IP): Performed by: EMERGENCY MEDICINE

## 2022-09-20 PROCEDURE — 85025 COMPLETE CBC W/AUTO DIFF WBC: CPT

## 2022-09-20 PROCEDURE — 80048 BASIC METABOLIC PNL TOTAL CA: CPT

## 2022-09-20 PROCEDURE — 93005 ELECTROCARDIOGRAM TRACING: CPT | Performed by: PHYSICIAN ASSISTANT

## 2022-09-20 PROCEDURE — 84484 ASSAY OF TROPONIN QUANT: CPT

## 2022-09-20 PROCEDURE — 70450 CT HEAD/BRAIN W/O DYE: CPT

## 2022-09-20 PROCEDURE — 2580000003 HC RX 258: Performed by: PHYSICIAN ASSISTANT

## 2022-09-20 PROCEDURE — 71046 X-RAY EXAM CHEST 2 VIEWS: CPT

## 2022-09-20 PROCEDURE — 99285 EMERGENCY DEPT VISIT HI MDM: CPT

## 2022-09-20 RX ORDER — MECLIZINE HYDROCHLORIDE 25 MG/1
25 TABLET ORAL ONCE
Status: COMPLETED | OUTPATIENT
Start: 2022-09-20 | End: 2022-09-20

## 2022-09-20 RX ORDER — MECLIZINE HYDROCHLORIDE 25 MG/1
25 TABLET ORAL 3 TIMES DAILY PRN
Qty: 20 TABLET | Refills: 0 | Status: SHIPPED | OUTPATIENT
Start: 2022-09-20

## 2022-09-20 RX ORDER — 0.9 % SODIUM CHLORIDE 0.9 %
1000 INTRAVENOUS SOLUTION INTRAVENOUS ONCE
Status: COMPLETED | OUTPATIENT
Start: 2022-09-20 | End: 2022-09-20

## 2022-09-20 RX ADMIN — MECLIZINE HYDROCHLORIDE 25 MG: 25 TABLET ORAL at 11:05

## 2022-09-20 RX ADMIN — SODIUM CHLORIDE 1000 ML: 9 INJECTION, SOLUTION INTRAVENOUS at 11:24

## 2022-09-20 RX ADMIN — IOPAMIDOL 75 ML: 755 INJECTION, SOLUTION INTRAVENOUS at 11:14

## 2022-09-20 ASSESSMENT — ENCOUNTER SYMPTOMS
CHEST TIGHTNESS: 0
NAUSEA: 0
EYES NEGATIVE: 1
VOMITING: 0
ABDOMINAL PAIN: 0
BACK PAIN: 0
SHORTNESS OF BREATH: 0

## 2022-09-20 NOTE — DISCHARGE INSTRUCTIONS
-Meclizine as prescribed  -drink fluids to stay well hydrated  -follow up with your neurologist as scheduled  -follow up with your PCP  -return for worsening symptoms such as uncontrolled vomiting, changes with speech, numbness or your extremities or other concerns

## 2022-09-20 NOTE — ED TRIAGE NOTES
Patient presents to ED with concerns for increased dizziness. Patient also endorses some \"numbness\" but states this is an ongoing issue that he has a follow up appointment for.

## 2022-09-20 NOTE — ED PROVIDER NOTES
CAPSULE    Take 25 mg by mouth every 6 hours as needed    LISINOPRIL (PRINIVIL;ZESTRIL) 5 MG TABLET        ROPINIROLE (REQUIP) 0.5 MG TABLET    Take 0.5 mg by mouth in the morning and 0.5 mg in the evening. Take with meals. TOPIRAMATE (TOPAMAX) 25 MG TABLET    Take 25 mg by mouth in the morning and 25 mg before bedtime. Allergies     He has No Known Allergies. Physical Exam     INITIAL VITALS: BP: (!) 137/93, Temp: 98 °F (36.7 °C), Heart Rate: 58, Resp: 14, SpO2: 100 %  Physical Exam  Vitals and nursing note reviewed. Constitutional:       Appearance: He is well-developed. HENT:      Head: Normocephalic and atraumatic. Nose: Nose normal.      Mouth/Throat:      Mouth: Mucous membranes are moist.   Eyes:      Extraocular Movements: Extraocular movements intact. Conjunctiva/sclera: Conjunctivae normal.      Pupils: Pupils are equal, round, and reactive to light. Cardiovascular:      Rate and Rhythm: Normal rate and regular rhythm. Pulses: Normal pulses. Heart sounds: Normal heart sounds. No murmur heard. Pulmonary:      Effort: Pulmonary effort is normal.      Breath sounds: Normal breath sounds. No wheezing or rhonchi. Abdominal:      General: Bowel sounds are normal.      Palpations: Abdomen is soft. Tenderness: no abdominal tenderness There is no guarding or rebound. Musculoskeletal:         General: Normal range of motion. Cervical back: Normal range of motion and neck supple. Comments: Distal pulses 2+ bilaterally of upper and lower extremities. He is moving all extremities without difficulty. Skin:     General: Skin is warm and dry. Capillary Refill: Capillary refill takes less than 2 seconds. Neurological:      General: No focal deficit present. Mental Status: He is alert and oriented to person, place, and time. Sensory: No sensory deficit. Deep Tendon Reflexes: Reflexes normal.      Comments:  On examination he does complain of some tingling sensation on the right upper and lower extremity. 5 out of 5 strength of bilateral upper and lower extremities. No nystagmus on examination. No pronator drift. No facial asymmetry is noted. He is ambulating slowly but without difficulty   Psychiatric:         Mood and Affect: Mood normal.         Behavior: Behavior normal.         Thought Content: Thought content normal.         Judgment: Judgment normal.       Diagnostic Results     EKG   Interpreted in conjunction with emergency department physician No att. providers found  Sinus rhythm with a bradycardic rate of 54. Normal intervals, normal axis. There is evidence of a right bundle branch block with no ST elevation or depression. No acute ischemic patterns. RADIOLOGY:  CTA HEAD NECK W CONTRAST   Final Result   1. Normal right internal carotid artery. .   2. Normal left internal carotid artery   3. Normal vertebral arteries. 4. Normal intracranial circulation. CT HEAD WO CONTRAST   Final Result   1. Cerebral frontal cortical atrophic changes and cerebellar atrophy   2. No evidence of acute intracranial hemorrhage, mass or extra-axial fluid collections. XR CHEST (2 VW)   Final Result   1. No acute cardiopulmonary abnormalities.    2. No active airspace disease             LABS:   Results for orders placed or performed during the hospital encounter of 09/20/22   CBC with Auto Differential   Result Value Ref Range    WBC 4.9 4.0 - 11.0 K/uL    RBC 4.67 4.20 - 5.90 M/uL    Hemoglobin 13.9 13.5 - 17.5 g/dL    Hematocrit 42.6 40.5 - 52.5 %    MCV 91.2 80.0 - 100.0 fL    MCH 29.8 26.0 - 34.0 pg    MCHC 32.7 31.0 - 36.0 g/dL    RDW 15.5 (H) 12.4 - 15.4 %    Platelets 451 196 - 567 K/uL    MPV 7.4 5.0 - 10.5 fL    Neutrophils % 50.5 %    Lymphocytes % 36.3 %    Monocytes % 7.9 %    Eosinophils % 4.6 %    Basophils % 0.7 %    Neutrophils Absolute 2.5 1.7 - 7.7 K/uL    Lymphocytes Absolute 1.8 1.0 - 5.1 K/uL    Monocytes Absolute 0.4 0.0 - 1.3 K/uL    Eosinophils Absolute 0.2 0.0 - 0.6 K/uL    Basophils Absolute 0.0 0.0 - 0.2 K/uL   Basic Metabolic Panel w/ Reflex to MG   Result Value Ref Range    Sodium 139 136 - 145 mmol/L    Potassium reflex Magnesium 4.4 3.5 - 5.1 mmol/L    Chloride 108 99 - 110 mmol/L    CO2 21 21 - 32 mmol/L    Anion Gap 10 3 - 16    Glucose 93 70 - 99 mg/dL    BUN 30 (H) 7 - 20 mg/dL    Creatinine 1.5 (H) 0.8 - 1.3 mg/dL    GFR Non- 48 (A) >60    GFR  58 (A) >60    Calcium 9.1 8.3 - 10.6 mg/dL   Troponin   Result Value Ref Range    Troponin <0.01 <0.01 ng/mL   EKG 12 Lead   Result Value Ref Range    Ventricular Rate 54 BPM    Atrial Rate 54 BPM    P-R Interval 176 ms    QRS Duration 100 ms    Q-T Interval 416 ms    QTc Calculation (Bazett) 394 ms    P Axis 44 degrees    R Axis 44 degrees    T Axis 20 degrees    Diagnosis       EKG performed in ER and to be interpreted by ER physician. Confirmed by MD, ER ([id number]),  Lula Godoy ([id number]) on 9/20/2022 10:00:55 AM       ED BEDSIDE ULTRASOUND:  No results found. RECENT VITALS:  BP: (!) 137/93, Temp: 98 °F (36.7 °C), Heart Rate: 74, Resp: 19, SpO2: 100 %     Procedures         ED Course     Nursing Notes, Past Medical Hx,Past Surgical Hx, Social Hx, Allergies, and Family Hx were reviewed. The patient was given the following medications:  Orders Placed This Encounter   Medications    meclizine (ANTIVERT) tablet 25 mg    iopamidol (ISOVUE-370) 76 % injection 75 mL    0.9 % sodium chloride bolus    meclizine (ANTIVERT) 25 MG tablet     Sig: Take 1 tablet by mouth 3 times daily as needed for Dizziness     Dispense:  20 tablet     Refill:  0       CONSULTS:  None    MEDICAL DECISION MAKING / ASSESSMENT / PLAN     Alina Richard is a 62 y.o. male who presented to the emergency department with vertigo symptoms.   On examination here he is neurovascularly intact though was unsteady with walking at first arrival.  He had an IV established with labs drawn.  CBC is unremarkable. BMP showed a BUN of 30 with a creatinine of 1.5. Troponin is unremarkable. EKG shows no ischemic changes. CT head shows cerebral frontal cortical atrophic changes and cerebellar atrophy with no evidence of acute intracranial hemorrhage, mass or extra-axial fluid collections. CTA shows normal right internal carotid artery, left internal carotid artery, normal vertebral arteries with normal intracranial circulation. While here he was given a liter of fluids as well as meclizine 25 mg. Upon reexamination the patient states his symptoms have improved significantly. He was able to ambulate with a steady gait and without dizziness. The patient states he feels much better and would like to be discharged home. He will be discharged home with a prescription for Antivert as well as vertigo exercises. He has an appointment with his neurologist this Monday for back issues and will discuss the vertigo at that time. Given his normal exam, symptoms that improved with Antivert this is most likely secondary to vertigo and my suspicion for cerebrovascular accident is low. He should return immediately for worsening symptoms or concerns as discussed. This patient was also evaluated by the attending physician. All care plans were discussed and agreed upon. Clinical Impression     1.  Vertigo        Disposition     PATIENT REFERRED TO:  neurology as scheduled Monday          [facility] [address] [address]  [phone]      As needed, If symptoms worsen    DISCHARGE MEDICATIONS:  New Prescriptions    MECLIZINE (ANTIVERT) 25 MG TABLET    Take 1 tablet by mouth 3 times daily as needed for Dizziness       DISPOSITION Decision To Discharge 09/20/2022 01:29:49 PM       Yanelis Poseyma  09/20/22 [id number]

## 2022-09-20 NOTE — ED PROVIDER NOTES
ED Attending Attestation Note     Date of evaluation: 9/20/2022    This patient was seen by the advance practice provider. I have seen and examined the patient, agree with the workup, evaluation, management and diagnosis. The care plan has been discussed. I have reviewed the ECG and concur with the DOMINGA's interpretation. My assessment reveals patient here with dizziness after bending over. Patient describes sensation of vertigo. Had difficulty walking afterwards. Continues to have symptoms here. CT and CTA of the head and neck were performed here and these are both unremarkable. Labs are unremarkable as well. We will treat as peripheral vertigo and discharged on meclizine.      Yu Humphries MD  09/20/22 [id number]

## 2022-10-24 ENCOUNTER — HOSPITAL ENCOUNTER (OUTPATIENT)
Dept: ULTRASOUND IMAGING | Age: 62
Discharge: HOME | End: 2022-10-24
Payer: COMMERCIAL

## 2022-10-24 DIAGNOSIS — E07.9 THYROID MASS: ICD-10-CM

## 2022-10-24 PROCEDURE — 76536 US EXAM OF HEAD AND NECK: CPT

## 2022-12-12 SDOH — HEALTH STABILITY: PHYSICAL HEALTH: ON AVERAGE, HOW MANY DAYS PER WEEK DO YOU ENGAGE IN MODERATE TO STRENUOUS EXERCISE (LIKE A BRISK WALK)?: 2 DAYS

## 2022-12-12 ASSESSMENT — SOCIAL DETERMINANTS OF HEALTH (SDOH)
WITHIN THE LAST YEAR, HAVE YOU BEEN KICKED, HIT, SLAPPED, OR OTHERWISE PHYSICALLY HURT BY YOUR PARTNER OR EX-PARTNER?: NO
WITHIN THE LAST YEAR, HAVE YOU BEEN AFRAID OF YOUR PARTNER OR EX-PARTNER?: NO
WITHIN THE LAST YEAR, HAVE YOU BEEN HUMILIATED OR EMOTIONALLY ABUSED IN OTHER WAYS BY YOUR PARTNER OR EX-PARTNER?: NO
WITHIN THE LAST YEAR, HAVE TO BEEN RAPED OR FORCED TO HAVE ANY KIND OF SEXUAL ACTIVITY BY YOUR PARTNER OR EX-PARTNER?: NO

## 2022-12-15 ENCOUNTER — OFFICE VISIT (OUTPATIENT)
Dept: FAMILY MEDICINE CLINIC | Age: 62
End: 2022-12-15
Payer: COMMERCIAL

## 2022-12-15 VITALS
SYSTOLIC BLOOD PRESSURE: 118 MMHG | HEIGHT: 70 IN | OXYGEN SATURATION: 96 % | BODY MASS INDEX: 38.37 KG/M2 | HEART RATE: 80 BPM | WEIGHT: 268 LBS | DIASTOLIC BLOOD PRESSURE: 68 MMHG

## 2022-12-15 DIAGNOSIS — M79.7 FIBROMYALGIA: ICD-10-CM

## 2022-12-15 DIAGNOSIS — N18.30 STAGE 3 CHRONIC KIDNEY DISEASE, UNSPECIFIED WHETHER STAGE 3A OR 3B CKD (HCC): ICD-10-CM

## 2022-12-15 DIAGNOSIS — G62.9 NEUROPATHY: ICD-10-CM

## 2022-12-15 DIAGNOSIS — Z76.89 ENCOUNTER TO ESTABLISH CARE: ICD-10-CM

## 2022-12-15 DIAGNOSIS — M1A.9XX0 CHRONIC GOUT WITHOUT TOPHUS, UNSPECIFIED CAUSE, UNSPECIFIED SITE: ICD-10-CM

## 2022-12-15 DIAGNOSIS — M51.36 DDD (DEGENERATIVE DISC DISEASE), LUMBAR: ICD-10-CM

## 2022-12-15 DIAGNOSIS — Z00.00 PHYSICAL EXAM: Primary | ICD-10-CM

## 2022-12-15 PROCEDURE — 99386 PREV VISIT NEW AGE 40-64: CPT | Performed by: REGISTERED NURSE

## 2022-12-15 SDOH — ECONOMIC STABILITY: FOOD INSECURITY: WITHIN THE PAST 12 MONTHS, THE FOOD YOU BOUGHT JUST DIDN'T LAST AND YOU DIDN'T HAVE MONEY TO GET MORE.: NEVER TRUE

## 2022-12-15 SDOH — ECONOMIC STABILITY: FOOD INSECURITY: WITHIN THE PAST 12 MONTHS, YOU WORRIED THAT YOUR FOOD WOULD RUN OUT BEFORE YOU GOT MONEY TO BUY MORE.: NEVER TRUE

## 2022-12-15 ASSESSMENT — ENCOUNTER SYMPTOMS
GASTROINTESTINAL NEGATIVE: 1
EYES NEGATIVE: 1
RESPIRATORY NEGATIVE: 1
BACK PAIN: 1

## 2022-12-15 ASSESSMENT — PATIENT HEALTH QUESTIONNAIRE - PHQ9
1. LITTLE INTEREST OR PLEASURE IN DOING THINGS: 0
SUM OF ALL RESPONSES TO PHQ9 QUESTIONS 1 & 2: 0
SUM OF ALL RESPONSES TO PHQ QUESTIONS 1-9: 0
2. FEELING DOWN, DEPRESSED OR HOPELESS: 0
SUM OF ALL RESPONSES TO PHQ QUESTIONS 1-9: 0

## 2022-12-15 ASSESSMENT — SOCIAL DETERMINANTS OF HEALTH (SDOH): HOW HARD IS IT FOR YOU TO PAY FOR THE VERY BASICS LIKE FOOD, HOUSING, MEDICAL CARE, AND HEATING?: NOT HARD AT ALL

## 2022-12-15 NOTE — PROGRESS NOTES
Patient: Bernardo Adkins is a 62 y.o. male who presents today with the following Chief Complaint(s):  Chief Complaint   Patient presents with    New Patient     No cc       Assessment/Plan:    1. Physical exam  Colonoscopy is due in 2025. Declines flu vaccination. Last lipid level 10/27/22- LDL and total cholesterol slightly elevated- recheck at next physical.    2. DDD (degenerative disc disease), lumbar  Follow up with Ortho specialist and chiropractor as planned. 3. Fibromyalgia  Stable. Follow-up with rheumatology as planned. 4. Neuropathy  Stable. Continue ropinirole as prescribed. 5. Chronic gout without tophus, unspecified cause, unspecified site  Stable. Continue allopurinol. 6. Stage 3 chronic kidney disease, unspecified whether stage 3a or 3b CKD (HCC)  Stable. Follow-up with nephrology as planned. 7. Encounter to establish care  Patient's past medical history, surgical history, family history, medications,  and allergies were all reviewed and updated as appropriate today. Return if symptoms worsen or fail to improve. HPI:     He is here to establish care. He has a history of DDD, neuropathy, fibromyalgia, gout, and chronic kidney disease. He sees the following specialists:  Dr. Marlene Hyatt- CKD  Dr. Karlos Carlos- Pain management  Dr. Aditi Gee [place]- for back pain and neuropathy  Dr. Annie Jefferson- Rheumatology    He denies any issues or concerns today. Current Outpatient Medications   Medication Sig Dispense Refill    meclizine (ANTIVERT) 25 MG tablet Take 1 tablet by mouth 3 times daily as needed for Dizziness 20 tablet 0    allopurinol (ZYLOPRIM) 300 MG tablet       celecoxib (CELEBREX) 200 MG capsule       lisinopril (PRINIVIL;ZESTRIL) 5 MG tablet       topiramate (TOPAMAX) 25 MG tablet Take 25 mg by mouth in the morning and 25 mg before bedtime. rOPINIRole (REQUIP) 0.5 MG tablet Take 0.5 mg by mouth in the morning and 0.5 mg in the evening. Take with meals. diphenhydrAMINE (BENADRYL) 25 MG capsule Take 25 mg by mouth every 6 hours as needed       No current facility-administered medications for this visit. Review of Systems   Constitutional: Negative. HENT: Negative. Eyes: Negative. Respiratory: Negative. Cardiovascular: Negative. Gastrointestinal: Negative. Endocrine: Negative. Genitourinary: Negative. Musculoskeletal:  Positive for back pain (Chronic back pain). Skin: Negative. Hematological: Negative. Psychiatric/Behavioral: Negative. Vitals:    12/15/22 0909   BP: 118/68   Pulse: 80   SpO2: 96%   Weight: 268 lb (121.6 kg)   Height: 5' 10\" (1.778 m)     Physical Exam  Vitals reviewed. Constitutional:       General: He is not in acute distress. Appearance: Normal appearance. He is not ill-appearing, toxic-appearing or diaphoretic. HENT:      Head: Normocephalic and atraumatic. Right Ear: Tympanic membrane, ear canal and external ear normal. There is no impacted cerumen. Left Ear: Tympanic membrane, ear canal and external ear normal. There is no impacted cerumen. Nose: Nose normal. No congestion or rhinorrhea. Mouth/Throat:      Mouth: Mucous membranes are moist.      Pharynx: Oropharynx is clear. No oropharyngeal exudate or posterior oropharyngeal erythema. Eyes:      General:         Right eye: No discharge. Left eye: No discharge. Extraocular Movements: Extraocular movements intact. Conjunctiva/sclera: Conjunctivae normal.      Pupils: Pupils are equal, round, and reactive to light. Cardiovascular:      Rate and Rhythm: Normal rate and regular rhythm. Pulses: Normal pulses. Heart sounds: Normal heart sounds. No murmur heard. No friction rub. No gallop. Pulmonary:      Effort: Pulmonary effort is normal.      Breath sounds: Normal breath sounds. No stridor. No wheezing, rhonchi or rales. Abdominal:      General: Abdomen is flat. Bowel sounds are normal. There is no distension. Palpations: Abdomen is soft. There is no mass. Tenderness: There is no abdominal tenderness. There is no guarding or rebound. Hernia: No hernia is present. Musculoskeletal:         General: Normal range of motion. Cervical back: Normal range of motion and neck supple. No tenderness. Right lower leg: No edema. Left lower leg: No edema. Lymphadenopathy:      Cervical: No cervical adenopathy. Skin:     General: Skin is warm and dry. Capillary Refill: Capillary refill takes less than 2 seconds. Coloration: Skin is not jaundiced or pale. Findings: No erythema. Neurological:      General: No focal deficit present. Mental Status: He is alert and oriented to person, place, and time. Psychiatric:         Mood and Affect: Mood normal.         Behavior: Behavior normal.         Thought Content: Thought content normal.         Judgment: Judgment normal.       Patient Active Problem List   Diagnosis    Lumbar spine pain    DDD (degenerative disc disease), lumbar    Spinal stenosis of lumbar region with neurogenic claudication    Bilateral leg pain     Past Medical History:   Diagnosis Date    Chronic back pain     Chronic kidney disease     Chronic kidney disease, stage 3 (HCC)     DDD (degenerative disc disease), lumbar     Depression     Fibromyalgia     Gout     Headache     Hypertension     Irritable bowel syndrome       Past Surgical History:   Procedure Laterality Date    APPENDECTOMY  1984    COLONOSCOPY      TONSILLECTOMY      VASECTOMY         Family History   Problem Relation Age of Onset    Arthritis Father     Cancer Father     High Blood Pressure Father       No Known Allergies    This chart was generated using the [address] [place] [address] [employer] system. I created this record but it may contain dictation errors due to the limitation of the software.

## 2023-02-14 ENCOUNTER — TELEPHONE (OUTPATIENT)
Dept: FAMILY MEDICINE CLINIC | Age: 63
End: 2023-02-14

## 2023-05-30 ENCOUNTER — COMMUNITY OUTREACH (OUTPATIENT)
Dept: FAMILY MEDICINE CLINIC | Age: 63
End: 2023-05-30

## 2023-05-30 NOTE — PROGRESS NOTES
Patient's HM shows they are overdue for Colonoscopy. [employer] and  files searched without success. Request for colonoscopy faxed to GI provider.